# Patient Record
Sex: FEMALE | Race: WHITE | Employment: UNEMPLOYED | URBAN - METROPOLITAN AREA
[De-identification: names, ages, dates, MRNs, and addresses within clinical notes are randomized per-mention and may not be internally consistent; named-entity substitution may affect disease eponyms.]

---

## 2017-11-09 ENCOUNTER — HOSPITAL ENCOUNTER (OUTPATIENT)
Dept: PSYCHIATRY | Age: 37
Discharge: HOME | End: 2017-11-30
Admitting: PSYCHIATRY & NEUROLOGY

## 2017-12-01 ENCOUNTER — HOSPITAL ENCOUNTER (OUTPATIENT)
Dept: OTHER | Age: 37
Discharge: HOME | End: 2017-12-31
Attending: PSYCHIATRY & NEUROLOGY | Admitting: PSYCHIATRY & NEUROLOGY

## 2018-06-05 ENCOUNTER — HOSPITAL ENCOUNTER (OUTPATIENT)
Dept: PHYSICAL THERAPY | Age: 38
Discharge: HOME | End: 2018-06-30
Admitting: PHYSICAL MEDICINE & REHABILITATION

## 2018-06-05 NOTE — PROGRESS NOTES
Patient did not indicate pain in bilateral LE just numbness in right LE. Social History/Environment:    Patient lives with significant other and 6 children in a 1 story house with 4 steps and one rail to enter. Prior Level of Function:     Patient was independent with all functional mobility and did not use any assistive device. Functional Complaints/ Current Level of Function:    Patient unable to use right UE. Patient has weakness in right LE. Patient goal for therapy:  \"increase strength in right LE \"    OBJECTIVE FINDINGS    Cognitive Status  Patient to see Speech therapist    Communication  impaired    Cardiopulmonary Status not formally assessed     Tone slight increase tone right LE. Trunk Control good    Sensation    Upper Extremity: patient to OT   Lower Extremity: decreased sensation right compared to left throughout.      Strength/ROM/Function  Patient to see OT for UE  Movement Left Right Comments Movement Left Right Comments   Shoulder Flexion    Hip Flexion WNL WFL with 4-/5  All left LE 5/5   Shoulder Extension    Hip Extension WNL WFL    Shoulder Abduction    Hip Abduction WNL 4-/5     Shoulder IR    Hip Adduction WNL 2/5     Shoulder ER    Hip Internal Rotation N/T N/T    Elbow Flexion     Hip External Rotation N/T N/T    Elbow Extension    Knee Flexion 137 130 degrees; 4-/5    Supination    Knee Extension 0 0; 3+/5    Pronation    Ankle Dorsiflexion 10 Minus 5 with from neutral with increase ER at hip    Wrist Flexion    Ankle Plantarflexion 45 24 with IR at hip    Wrist Extension    Ankle Inversion 35 25    Hand    Ankle Eversion 10 7    Poor quad control right compared to left    Functional Mobility    Transitional Movement Assistance Level Comments   Rolling to left side Modified Indep    Rolling to right side Modified Indep    Scooting up in bed     Scooting down in bed     Supine to sit Modified Indep    Sit to supine Indep    Sit to stand Indep    Stand to sit Indep Bed to chair transfer     Toilet transfer     Tub transfer     Car transfer     Reflexes: patella 1/3 on right, left 2/3; bilateral achilles 2/3     Balance    Static Sitting: good  Dynamic Sitting: good  Static Stance: good  Dynamic Stance: fair plus   Tinetti Total Score:  24/28    Balance: 14 Gait: 10 Risk of Falls:  low      Gait    Assistance Level: SBA  Device:  None  Orthotics: none   Distance: Through facility  Deviations: weakness noted in right quad. Steps  Step to pattern of gait with ascending and descending stairs. Patient demonstrates noted hyperextension right LE with stairs. Functional Outcome Measure    Measure used:  LEFS  Score: 31/80=39%   % Disability: 61%     ASSESSMENT  Patient demonstrates slight decrease in coordination in right LE. Patient also demonstrates decreased strength in right LE with decreased functional mobility with higher level gait such as stairs. GCode:  /CL    Problems    Decreased ROM      Decreased strength  Decreased joint mobility  Decreased flexibility     Abnormality of gait  Decreased functional status    Decreased balance=- higher level  Poor posture/alignment     Decreased sensation/proprioception    Rehabilitation Potential:  Good for goals listed below. Strengths for achieving goals include:  motivation  Limitations for achieving goals include:  Severity of condition    Prognosis: [x]    Good []    Fair []    Poor    GOALS  GCode: /CI   Short Term Goals (    weeks) Long Term Goals (   weeks)   1). Initiation of HEP 1). Increase right DF to 5 degrees. 2). Fair quad contraction right LE.  2). Increase right LE overall strength to 4/5.   3). Patient able to roll over in bed with minimal to no noted difficulty. 3). Patient able to ascend and descend stairs with reciprocating pattern. 4). 4). LEFS to less than 20%   5). 5). Patient able to amb 200 feet with good heel/toe progression. 6). 6).       PLAN OF CARE    To see patient  2 x/week

## 2018-06-05 NOTE — FLOWSHEET NOTE
Outpatient Physical Therapy     [x] Daily Treatment Note   [] Progress Note   [] Discharge Note    Date:  6/5/2018    Patient Name:  Shruti Mohan        YOB: 1980    Medical Diagnosis:   ICH (Intercranial Hemorrhage) and S/P craniectomy with clot removal; persistent right leslie                                        ICD 10:  I63.9     Treatment Diagnosis:  Right LE weakness and Gait abnormality (PT for advanced gait)     Onset Date:    4/28/18                 Referral Date: 6/1/18      Referring Physician: Amada Neves from [facility] [place]                                                     Visits Allowed/Insurance/Certification Information: [facility] 30 visits     Restrictions/Precautions:  Patient to wear helmet. Patient's mother reports that patient is not to lay flat. Patient also wearing Givmohr sling on right UE for flaccid UE. (which was not donned at beginning of session and mother asked to instruct on donning). Progress Note covers period from (if applicable):    [x]  NA    [] From          To           Next Progress Note due:   6/26/18    Visit# / total visits:  1/12    Plan for Next Session:  Gait training, right LE strengthening with concentration on quad and hip region. Higher level balance activity. Assess single leg balance. Subjective: see evaluation     Pain level: Patient did not indicate pain in bilateral LE just numbness in right. Objective:       Exercises:    Exercises in bold performed in department today. Items not bolded are carried forward from prior visits for continuity of the record. Exercise/Equipment Resistance/Repetitions Other comments   Education: results of evaluation, exercise and plan of care. SAQ and SLR   1x10  Instructed to perform on left LE first then right to aid with muscle education.

## 2018-06-11 ENCOUNTER — ANTI-COAG VISIT (OUTPATIENT)
Dept: PHARMACY | Facility: CLINIC | Age: 38
End: 2018-06-11

## 2018-06-11 LAB — INR BLD: 1.7

## 2018-06-11 RX ORDER — FLUOXETINE HYDROCHLORIDE 20 MG/1
20 CAPSULE ORAL DAILY
COMMUNITY
End: 2020-07-28

## 2018-06-11 RX ORDER — GABAPENTIN 300 MG/1
300 CAPSULE ORAL 3 TIMES DAILY
COMMUNITY

## 2018-06-11 RX ORDER — ATORVASTATIN CALCIUM 80 MG/1
80 TABLET, FILM COATED ORAL DAILY
COMMUNITY

## 2018-06-11 RX ORDER — POLYETHYLENE GLYCOL 3350 17 G/17G
17 POWDER, FOR SOLUTION ORAL DAILY PRN
COMMUNITY

## 2018-06-11 RX ORDER — WARFARIN SODIUM 2.5 MG/1
2.5 TABLET ORAL DAILY
COMMUNITY
End: 2018-07-02 | Stop reason: SDUPTHER

## 2018-06-11 RX ORDER — ATORVASTATIN CALCIUM 40 MG/1
40 TABLET, FILM COATED ORAL DAILY
COMMUNITY
End: 2018-06-11 | Stop reason: CLARIF

## 2018-06-11 RX ORDER — LISINOPRIL 20 MG/1
20 TABLET ORAL DAILY
COMMUNITY

## 2018-06-11 RX ORDER — HYDROCODONE BITARTRATE AND ACETAMINOPHEN 5; 325 MG/1; MG/1
1 TABLET ORAL EVERY 6 HOURS PRN
COMMUNITY
End: 2019-02-08 | Stop reason: ALTCHOICE

## 2018-06-11 RX ORDER — DONEPEZIL HYDROCHLORIDE 5 MG/1
5 TABLET, FILM COATED ORAL NIGHTLY
COMMUNITY

## 2018-06-18 ENCOUNTER — ANTI-COAG VISIT (OUTPATIENT)
Dept: PHARMACY | Facility: CLINIC | Age: 38
End: 2018-06-18

## 2018-06-18 LAB — INR BLD: 4

## 2018-06-25 ENCOUNTER — ANTI-COAG VISIT (OUTPATIENT)
Dept: PHARMACY | Facility: CLINIC | Age: 38
End: 2018-06-25

## 2018-06-25 LAB — INR BLD: 3.8

## 2018-07-01 ENCOUNTER — HOSPITAL ENCOUNTER (OUTPATIENT)
Dept: OTHER | Age: 38
Discharge: HOME | End: 2018-07-01
Attending: PHYSICAL MEDICINE & REHABILITATION | Admitting: PHYSICAL MEDICINE & REHABILITATION

## 2018-07-02 ENCOUNTER — HOSPITAL ENCOUNTER (OUTPATIENT)
Dept: PHYSICAL THERAPY | Age: 38
Discharge: HOME | End: 2018-07-03

## 2018-07-02 ENCOUNTER — ANTI-COAG VISIT (OUTPATIENT)
Dept: PHARMACY | Facility: CLINIC | Age: 38
End: 2018-07-02

## 2018-07-02 LAB — INR BLD: 1.7

## 2018-07-02 RX ORDER — WARFARIN SODIUM 2.5 MG/1
TABLET ORAL
Qty: 30 TABLET | Refills: 3 | Status: SHIPPED | OUTPATIENT
Start: 2018-07-02 | End: 2019-02-04 | Stop reason: ALTCHOICE

## 2018-07-02 NOTE — PROGRESS NOTES
Occupational Therapy Daily Treatment Note/Monthly Progress Note    Date:  2018    Patient Name:  Sri Gandhi    :  1980  Restrictions/Precautions:  Patient to wear helmet. Patient's mother reports that patient is not to lay flat. Patient also wearing Givmohr sling on right UE for flaccid UE. Diagnosis:  I63.9 Cerebral Infarct Unspecified; ICH (Intercranial Hemorrhage) and S/P craniectomy with clot removal; persistent right leslie  Treatment Diagnosis:  Muscle weakness                ICD10:  N35.21  Insurance/Certification information:  [facility], 30 max visits allowed  Referring Physician:  Rica Linares from [address] [place] [address] signed (Y/N):  sent  Visit# / total visits:    Pain level: Mild in R hand. Subjective:  Pt. Accompanied by mother to OT however mother not present during treatment session. Pt. States she has mild pain in R hand. Pt. Presented with helmet on, no givhmor sling. Exercises:  Exercise/Equipment Resistance/Repetitions Other comments   Toileting Supervision and vc to complete managing clothes Not Completed Today   Toilet Transfers Mod I Not Completed Today   LB dressing Mod I; using elastic laces for shoes Not Completed Today   Grooming Mod I standing at sink Not Completed Today   R Resting hand Ortho   Established fit, wear for 15 min w/o s/s of skin breakdown or pain. Instructed pt and patient's mother on don/doffing ortho, PROM prior to application, and positioning. Pt. And mother instructed ortho to be discontinued if red marks do not disappear within 15 minutes and call OTR/L or MD.  Both demonstrated understanding. Not Completed Today   Towel Slides  On Table B UE AROM forward flex/ext; using dowel socrates for B UE integration. 2 minutes each motion Completed Today   Bed Positioning to improve positioning of shoulder joint and UE to prevent pain. This visit:  Dicussed positioning in bed with pt and her mother. Pt.'s mother reported no issues with positioning instructions for last visit. Last visit:  Instructed pt and mother on placement of pillows to support R shoulder and elevated R UE on mat table. Pt. mel understanding and mother verbalized understanding. Not Completed Today   Pre-writing   Instucted pt. On tracing letters k and n with dotted lines and use of pen gripper. Pt. radha'chetan min to moderate variation from dotted lines however presented with improved letter formation with printing her name. Issued practicing sheets with all letters of her name for home use. Last visit:  Pt. Instructed on writing training pattern sheet 2 for pre-writing activity using maker with gripper. PtKit leal improved legibility with printing name. Pt. mel decreased letter formation skills however name was legible. Not Completed Today   Edema management Pt. Presented with slight edema in R hand. Pt./pt mother instructed on PROM and massage to decrease edema in R hand. Educated pt on benefits of R resting orthotic and positioning of R hand elevated in bed to decrease edema. Not Completed Today    Rocking T Knife  Instructed pt on Rocking T knife to cut through theraputty in prep for self-feeding. PtKit leal independence to use rocking t knife. Pt. Given print out and information on where to purchase Rocking T Knife on previous visit. Completed Today   KT Tape Blue KT tape to R shoulder for proprioception and shoulder joint protection. Pt. Presented with good tolerance for tape during tx session. Not Completed Today   Folding clothes and towels and storing items. Mod I to fold towels and place in crate. Pt. And mother Instructed on use of belt to pull laundry basket for transporting clothes. Pt. And mother verbalized understanding. Not Completed Today   Givmorh Sling Doff independently  Min assist to don; Adjusted pt's givmohr sling to reduce wrist extension and provided stockinet for wrist/forearm.   Pt. Satisfied with improvement. Not Completed Today   AAROM Pt. Presented with great improvement with R AROM. 1) Pt. Completed 10 reps A/AAROM R UE  Elbow flex/ext  Pronation Supination  Wrist flex/ext  Fingers/thumb   Provided manual resistance for elbow flex/ext. 2)  Using B UE and dowel socrates with built up  for R hand pt. Completed arm 10-15 arm curls and raising dowel socrates to shoulder level with assist to monitor R UE. Pt. Required extra time and rests. Pt. Instructed to incorporate dowel socrates exercises into HEP routine. Completed Today     PROM R UE  Hand flex/ext (slight movement in first 3 digits) Not Completed Today   Weight Bearing 3 minutes; Pt c/o pain in rest.  Pt. Alleviated after weight removed. Not Completed Today                          Other Therapeutic Activities:       Home Exercise Program:  TBD      OBJECTIVE FINDINGS  Range of Motion  Hand  AROM       RIGHT LEFT-WFL     date 7/2     Shoulder:   flex 75 degrees                         IR WFL                         ER 25 degrees                        ABD 80 degrees     Elbow:      ext/flex WFL/ 125 degrees     Forearm:  sup/pron WFL     Wrist:       ext/flex 10/10                      RD/UD      Index:      MP No ext/ flex almost to palm     Middle, Ring, Small:     No ext/ flex to palm     Thumb:    MP 15                       IP 65 degrees flex; 0 degrees ext                         Comments:  Strength  Muscle  (*denotes pain) Right Left Comments       Elbow Flexion 2/5 5/5     Elbow Extension 2/5 5/5     Pronation 3/5 5/5     Supination 3/5 5/5     Wrist Flexion 2/5 5/5     Wrist Extension 2/5 5/5     Ulnar Deviation NT 5/5     Radial Deviation NT                    5/5           GOALS: G Code:  Handling Objects [id number]   Goals - to be achieved in  2  weeks Goals - to be achieved in  4  weeks   1). Pt./caregiver to demo Mod I for HEP. (Progressing 7/2/2018)  Pt. To demo independence to don/doff givmorh sling. (Progressing SBA-min assist.)   2). Pt. To demo ability to write one sentence legibly. (Progressing 7/2/2018) Pt. To verbalized good tolerance for up to 6 hours of resting hand orthosis wear with 0 s/s of skin breakdown or pain. (Goal Met 7/2/2018)   3). Pt. To demo ability to don/doff resting hand orthotic independently. (Goal Met 7/2/2018) Pt. To demo Mod I to complete folding and putting away clothes. (Goal Met 6/13/2018)   4). New Goal:  Pt. 20 degrees wrist flex/ext. New Goal:  Pt. To demo ability to wash L UE using wash mitt on R hand. 5). New Goal:   Pt. To demo mod I to wash face using wash mitt with R hand.     6).       Assessment:  G Code:  Handling Objects [id number]  RAW 68.18, 100% Disability  Pt. Had participated in 6 OT treatment sessions (1 cancellation) using thera ex, orthosis fit/check, ADL retraining and thera act. Pt. Has met   2:3 short term goals and 2:3 long term goals. Pt.has established R resting hand orthosis wear program during HS at night and demo's good tolerance. Pt.instruction on don/doff hand orthos, skin check, positioning and patient/caregiver ed/instruction. Pt and mother demo'd independence to complete. Pt. presented with great improvement with AROM in R UE. Pt. initially demonstrated flaccid R UE with no trace motion. Pt. Now demonstrates active ROM in shoulder, elbow, forearm, wrist and hand. Please see above for measurements. Pt. demo'd improved independence for self-feeding demonstrating ability to use [id number] using L hand. Pt. Would benefit from OT to continue to improve ROM, strength and coordination to improve functional independence.     Timed Code Treatment Minutes:   63 minutes     Total Treatment Minutes:   63 minutes    Treatment/Activity Tolerance:    [x] Patient tolerated treatment well [] Patient limited by fatigue  [] Patient limited by pain [] Patient limited by other medical complications  [] Other:     Patient Requires Follow-up:  [x] Yes  [] No    Frequency/Duration:  # Days per week: [] 1 day # Weeks: [] 1 week [] 4 weeks      [x] 2 days? [] 2 weeks [] 5 weeks     [] 3 days   [] 3 weeks [] 6 weeks     Rehab Potential: [] Excellent [x] Good [] Fair  [] Poor     Goal Status:  [] Achieved [x] Partially Achieved [] Not Achieved     Patient Status: [] Continue per initial plan of Care     [] Patient now discharged     [x] Additional visits requested, Please re-certify for additional visits:      Requested additional frequency/duration: 2X/week for 6 weeks    Treatment my include ADL retraining, thera ex/act, NMR, Modalities (E-Stim, Ultrasound, heat, cold), and patient/caregiver ed/instruction. Electronically signed by: COLTEN Bautista/STEPHEN  #[id number]    If you have any questions or concerns, please don't hesitate to call.   Thank you for your referral.    Physician Signature:________________________________Date:__________________  By signing above, therapists plan is approved by physician

## 2018-07-02 NOTE — FLOWSHEET NOTE
Feet together, firm surface    \"       Feet half tandem, firm surface    \"       Feet tandem, firm surface     \"       Feet apart, foam surface    \"       Feet together, foam surface     \"       Feet half tandem, foam surface         Feet tandem, foam surface                           Therapeutic Exercise/Home Exercise Program: x 30 minutes. HEP established, See above, pt has handout(s)   Limited time for ex today, pt waiting on PT with another pt, and PT did not want to run over into OT's time. Group Therapy:   N/A    Therapeutic Activity:  NA     Gait: NA  Neuromuscular Re-Education: NA      Manual Therapy: N/A    Modalities:  N/A    Functional Outcome Measure:   Date recorded: 6/5/18  Measure used:  LEFS  Score: 31/80=39%   % Disability: 61%    Assessment/Treatment/Activity Tolerance:    GCode:  /CL  Patients response to treatment:   [x] Patient tolerated treatment well [] Patient limited by fatigue   [] Patient limited by pain [] Patient limited by other medical complications   [] Other:     Goals:   Progress towards goals: NA    GOALS  GCode: /CI   Short Term Goals (    weeks) Long Term Goals (   weeks)   1). Initiation of HEP 1). Increase right DF to 5 degrees. 2). Fair quad contraction right LE.  2). Increase right LE overall strength to 4/5.   3). Patient able to roll over in bed with minimal to no noted difficulty. 3). Patient able to ascend and descend stairs with reciprocating pattern. 4). 4). LEFS to less than 20%   5). 5). Patient able to amb 200 feet with good heel/toe progression. 6). 6).      Prognosis: [x] Good [] Fair  [] Poor    Patient Requires Follow-up:  [x] Yes  [] No    Plan: [x] Continue per plan of care [] Alter current plan (see comments)   [] Plan of care initiated [] Hold pending MD visit [] Discharge    Timed Code Treatment Minutes:  30 minutes    Total Treatment Minutes:  30 minutes + scifit     Medicare Cap total YTD:  N/A    Electronically signed by:  Juliano Tejeda

## 2018-07-16 ENCOUNTER — ANTI-COAG VISIT (OUTPATIENT)
Dept: PHARMACY | Age: 38
End: 2018-07-16
Payer: COMMERCIAL

## 2018-07-16 ENCOUNTER — HOSPITAL ENCOUNTER (OUTPATIENT)
Dept: PHYSICAL THERAPY | Age: 38
Setting detail: THERAPIES SERIES
Discharge: HOME | End: 2018-07-16
Payer: COMMERCIAL

## 2018-07-16 ENCOUNTER — HOSPITAL ENCOUNTER (OUTPATIENT)
Dept: SPEECH THERAPY | Age: 38
Setting detail: THERAPIES SERIES
Discharge: HOME | End: 2018-07-16
Payer: COMMERCIAL

## 2018-07-16 ENCOUNTER — HOSPITAL ENCOUNTER (OUTPATIENT)
Dept: OCCUPATIONAL THERAPY | Age: 38
Setting detail: THERAPIES SERIES
Discharge: HOME | End: 2018-07-16
Payer: COMMERCIAL

## 2018-07-16 LAB — INR BLD: 1.4

## 2018-07-16 PROCEDURE — 92507 TX SP LANG VOICE COMM INDIV: CPT

## 2018-07-16 PROCEDURE — 97112 NEUROMUSCULAR REEDUCATION: CPT

## 2018-07-16 PROCEDURE — 99211 OFF/OP EST MAY X REQ PHY/QHP: CPT | Performed by: PHARMACIST

## 2018-07-16 PROCEDURE — 85610 PROTHROMBIN TIME: CPT | Performed by: PHARMACIST

## 2018-07-16 PROCEDURE — 97032 APPL MODALITY 1+ESTIM EA 15: CPT

## 2018-07-16 PROCEDURE — 97530 THERAPEUTIC ACTIVITIES: CPT

## 2018-07-16 PROCEDURE — 97110 THERAPEUTIC EXERCISES: CPT

## 2018-07-16 PROCEDURE — G0283 ELEC STIM OTHER THAN WOUND: HCPCS

## 2018-07-16 NOTE — PROGRESS NOTES
Occupational Therapy Daily Treatment Note    Date:  2018    Patient Name:  Jonathan Pritchard    :  1980  Restrictions/Precautions:  Patient to wear helmet. Patient's mother reports that patient is not to lay flat. Patient not wearing Givmohr sling on right UE this date. Diagnosis:  I63.9 Cerebral Infarct Unspecified; ICH (Intercranial Hemorrhage) and S/P craniectomy with clot removal; persistent right leslie  Treatment Diagnosis:  Muscle weakness                ICD10:  G54.42  Insurance/Certification information:  Care Source, 30 max visits allowed  Referring Physician:  Princess Dewey from [address] [place] [address] signed (Y/N):  sent  Visit# / total visits:    Pain level: denies    Subjective:  Pt presented with mild edema in right hand  Pt. Presented with helmet on, no givhmor sling. Exercises:  Exercise/Equipment Resistance/Repetitions Other comments   Toileting Supervision and vc to complete managing clothes Not Completed Today   Toilet Transfers Mod I Not Completed Today   LB dressing Mod I; using elastic laces for shoes Demonstrated doffing and donning socks and shoes with elastic laces in shoes with Mod I, reports mother only helping with upper body dressing   Grooming Mod I standing at sink Not Completed Today   R Resting hand Ortho   Established fit, wear for 15 min w/o s/s of skin breakdown or pain. Instructed pt and patient's mother on don/doffing ortho, PROM prior to application, and positioning. Pt. And mother instructed ortho to be discontinued if red marks do not disappear within 15 minutes and call OTR/L or MD.  Both demonstrated understanding. Not Completed Today    Towel Slides  On Table B UE AROM forward flex/ext; using dowel socrates for B UE integration.   2 minutes each motion Completed Today, with  cueing for maintaining even weight distribution bilaterally and minimizing substitutions    Bed Positioning to improve positioning of shoulder joint and UE to prevent pain. This visit:  Dicussed positioning in bed with pt and her mother. Pt.'s mother reported no issues with positioning instructions for last visit. Last visit:  Instructed pt and mother on placement of pillows to support R shoulder and elevated R UE on mat table. PtKit leal understanding and mother verbalized understanding. Not Completed Today   Pre-writing   Instucted pt. On LUE tracing letters k , r, I and s, with use of pen gripper. PtKit leal min to no variation from  lines and improved letter formation with printing her name. Pt encouraged to continue use of practicing sheets with all letters of her name for home use. PtKit leal improved legibility with printing name. Pt. mel decreased letter formation skills however name was legible. Completed Today  Able to write name only (repeatedly), even when trying to write answer to question-appeared frustrated briefly related to aphasia   Edema management Pt. Presented with slight edema in R hand. Pt. encouraged to continue use of PROM and massage to decrease edema in R hand. Educated pt on benefits of R resting orthotic and positioning of R hand elevated in bed to decrease edema. Completed Today-retrograde massage, self-massage    Rocking T Knife  Instructed pt on Rocking T knife to cut through theraputty in prep for self-feeding. PtKit leal independence to use rocking t knife. Pt. Given print out and information on where to purchase Rocking T Knife on previous visit. Not Completed Today   KT Tape Blue KT tape to R shoulder for proprioception and shoulder joint protection. Pt. Presented with good  tolerance for tape during tx session. Not Completed Today   Folding clothes and towels and storing items. Folded washcloths with Mod I using non dominant hand.  Completed Today   Givmorh Sling Doff independently  Min assist to don; Adjusted pt's givmohr sling to reduce wrist extension and provided stockinet for wrist/forearm. Pt. Satisfied with improvement. Not Completed Today-no sling present   AAROM Pt. Presented with  improvement with R AROM. 1) Pt. Completed 10 reps A/AAROM R UE  Elbow flex/ext  Pronation Supination  Wrist flex/ext  Fingers/thumb   Provided manual resistance for elbow flex/ext. 2)  Using B UE and dowel socartes with built up  for R hand and OT assist.  Completed 15 reps of arm curls and raising dowel socrates to shoulder level with assist to monitor R UE. Diagonal patterns 15 reps each direction. 3) Pt completed 15 reps scapular squeezes and shoulder shrugs x 3 sets  Pt. Instructed to incorporate into HEP routine. Completed Today     PROM R UE  Wrist and finger  Flex/ext x 10 Not Completed Today   Weight Bearing 15 minutes, seated. Reaching for items with LUE while weightbearing through R hand and forearm/elbow. B elbow touches on mat table 15 reps x 2 sets, Extended weight bearing through R hand and forearm/elbow 1 min each x 2. Partial sit to stands while pushing through B hands (facilitation support provided for R hand) on knees 15 reps x 3 sets. Pt. Instructed to incorporate into HEP routine. Completed Today                          Other Therapeutic Activities:  E-stim: On-time 14 seconds, Off time 15 seconds; Rate 50 PPS, Waveform: asymmetric; cycling: synchronous; Ramp time 2.0 sec; Pulse width 300; Timer 10 min; Max intensity 100; Min intensity 0; Level 31 10 minutes: Engaged in grasp/release task with each 15 second interval-squeeze ball, squeeze cube and small plastic cup            GOALS: G Code:  Handling Objects [id number]   Goals - to be achieved in  2  weeks Goals - to be achieved in  4  weeks   1). Pt./caregiver to demo Mod I for HEP. (Progressing 7/16/2018)  Pt. To demo independence to don/doff givmorh sling. 2). Pt. To demo ability to write one sentence legibly. (Progressing 1/[id number]) Pt.  To verbalized good tolerance for up to 6 hours of resting hand orthosis wear with 0 s/s

## 2018-07-16 NOTE — PROGRESS NOTES
Outpatient Physical Therapy     [x] Daily Treatment Note   [x] Progress Note   [] Discharge Note    Date:  7/16/2018    Patient Name:  Gurjit Gaitan        YOB: 1980    Medical Diagnosis:   ICH (Intercranial Hemorrhage) and S/P craniectomy with clot removal; persistent right leslie                                        ICD 10:  I63.9     Treatment Diagnosis:  Right LE weakness and Gait abnormality (PT for advanced gait)     Onset Date:    4/28/18                 Referral Date: 6/1/18      Referring Physician: Kirti Motley from [facility]                                                     Visits Allowed/Insurance/Certification Information: [facility] 30 visits     Restrictions/Precautions:  Patient to wear helmet. Patient's mother reports that patient is not to lay flat. Patient also wearing Givmohr sling on right UE for flaccid UE. (which was not donned at beginning of session and mother asked to instruct on donning). Expressive aphasia. Progress Note covers period from (if applicable):    []  NA    [x] From 6/5/18 to 7/9/18  (PN late due to PT overlooked it last visit)      Next Progress Note due:   7/25/18 (last visit prior to surgery)    Visit# / total visits:  8/12          Plan for Next Session:  Progress right LE strengthening with concentration on quad, all hip region and ankle PF. Higher level balance activity. Assess FGI     Subjective:   Mom states surgery date is 7/26/18. Pt reports no new issues since last visit  No more falls     Pain level: \"little bit\" on L side of head       Objective:   Pt to not lie flat without pillows, pt allowed to take helmet off if she is lying down. Exercises:    Exercises in bold performed in department today. Items not bolded are carried forward from prior visits for continuity of the record.   Exercise/Equipment Resistance/Repetitions Other comments   Education: results of evaluation, exercise and minutes    Total Treatment Minutes:  40 + sci-fit     Medicare Cap total YTD:  N/A    Electronically signed by:  Gaby Cordreo, [id number]

## 2018-07-16 NOTE — PROGRESS NOTES
Ms. Prosper Gaffney is here for management of anticoagulation for Stroke. PMH also significant for depression. She presents today w/out complaint. Pt verifies dosing regimen as listed above. Pt denies s/s bleeding/bruising/swelling/SOB. No BRBPR. No melena. Address missed doses  Reviewed pt medication list  No changes in RX/OTCs/Herbal medications. Reviewed dietary concerns  Address EToH and tobacco use. She presents today with her mother. Suffered stroke in April. Limited speaking ability    Surgery scheduled for July 26. We will manage bridging per PCP, plan for 7 days hold pre-op. Will be admitted for a few days post-op, will leave further management to surgery team.  Plan for INR check ~1 week after surgery. INR low today despite dose increase last visit. INR has been very unstable. Will increase dose, though pt will start hold in a couple days for surgery. Plan to restart at 2.5 mg daily post-op. INR 1.4 is below therapeutic range of 2-3   Recommend to take 5 mg today, then increase dose to 2.5 mg daily  Patient has 2.5 mg tablets. Will continue to monitor and check INR in ~3 week. Dosing reminder card given with phone number, appointment date and time.    Return to clinic: 8/6 @ 11:15 am    Eneida Tejeda PharmD 3:36 PM 7/16/18

## 2018-07-16 NOTE — PROGRESS NOTES
Speech Language Pathology  Facility/Department: [facility] SPEECH THERAPY  Daily Treatment Note    Date: 18  NAME: Danielle Agosto  : 1980  MRN: [id number]  PCP: Dr. Kamran Mario  Referring: Dr. Zhao Sarmiento; 30 visits alloted  Medical Dx: Cerebral infarction s/p craniectomy  Treatment Dx: Severe apraxia of speech, severe expressive language deficits, moderate receptive language deficits; cognitive-linguistic skills to be further assessed as clinically indicated. Signed POC: Faxed 18  Visit Rec: 2x/week for 6-8 weeks  Visit #:   31-NYQ: 50  Re-Cert: After 18 visits, or 18 (90 days)  Pain: 0/10    Subjective: The pt was her pleasant, cooperative self. She arrived ~40 minutes late to today's session. Objective:  Goal 1: The pt will follow 1 and 2-step commands with 80% accuracy, min cues. Indirectly targeted during session (1-step commands only)    Goal 2: The pt will answer complex yes/no questions wtih 80% accuracy, min cues. Indirectly targeted during session (basic yes/no)    Goal 3: The pt will produce intelligible phrases that are judged to be 100% intelligible by SLP with use of KATT in 7/10 trials, mod cues. -Opposites:   -Verbal:  (79% average accuracy) trials, min cues (approximation of words were counted as intelligible verbal response). -Names:    -Verbal: 100% average accuracy, min-mod cues; significantly less cueing required this date. Approximation of words were counted as intelligible verbal response). Goal 4: The pt will complete automatic speech tasks using any modality (verbal, gesture, writing, etc.) with 80% accuracy, mod cues. -Orientation questions: Pt able to successfully / intelligibly state that today was \"Monday\". -Names:    -Verbal: 100% average accuracy, min-mod cues; significantly less cueing required this date. Approximation of words were counted as intelligible verbal response).     -ID letters: indirectly targeted (see below, spelling/letters by dictation)  -Counting 1-10 (with visual, SLP encouraged pt to point to each number as she said it): did not directly target this date  -Open-ended questions (ex. \"How are. ..? \"):    -Verbal: 4/5 (80% average accuracy) questions, min-mod cues    Goal 5: The pt will complete graded naming tasks with 90% average accuracy, mod cues  -Opposites:   -Verbal: 11/14 (79% average accuracy) trials, min cues (approximation of words were counted as intelligible verbal response). -Open-ended questions (ex. \"How are. ..? \"):    -Verbal: 4/5 (80% average accuracy) questions, min-mod cues    Goal 6: The pt will complete basic comprehension tasks with 90% accuracy, min cues. -Reading comprehension task (ID sentence that correlates with picture, f2): 100% average accuracy, no cues required    Other Areas Targeted:   -Writing (single words): 4/6 words (67% average accuracy), min cues; limited trials completed  -Spelling (letters to dictation): 2/3 letters (67% average accuracy), min cues; limited trials completed      Assessment:  The pt was pleasant and cooperative throughout session. She was ~40 minutes late to today's session. Limited trials able to be completed of targeted structured tasks 2/2 time constraint. Pt required significantly less cueing to complete verbal expression tasks, however, pt's responses continue to be largely 1-word utterances. Will continue to target goals per POC. Patient/Family/Caregiver Education: SLP provided pt with several verbal expression handouts to complete at home for carryover of information. SLP also encouraged pt to complete automatic speech tasks daily (names, numbers, CHATO, etc.). She demonstrated understanding, but would benefit from ongoing reinforcement. G-Code:  SLP G-Codes  Functional Limitations: Motor speech  Motor Speech Current Status ():  At least 80 percent but less than 100 percent impaired, limited or restricted  Motor Speech

## 2018-07-18 ENCOUNTER — HOSPITAL ENCOUNTER (OUTPATIENT)
Dept: PHYSICAL THERAPY | Age: 38
Setting detail: THERAPIES SERIES
Discharge: HOME | End: 2018-07-18
Payer: COMMERCIAL

## 2018-07-18 ENCOUNTER — APPOINTMENT (OUTPATIENT)
Dept: SPEECH THERAPY | Age: 38
End: 2018-07-18
Payer: COMMERCIAL

## 2018-07-18 ENCOUNTER — APPOINTMENT (OUTPATIENT)
Dept: OCCUPATIONAL THERAPY | Age: 38
End: 2018-07-18
Payer: COMMERCIAL

## 2018-07-18 NOTE — FLOWSHEET NOTE
Physical Therapy  Cancellation/No-show Note  Patient Name:  Sam Arevalo  :  1980   Date:  2018  Cancels to Date: 2  No-shows to Date: 1    For today's appointment patient:  [x]  Cancelled  []  Rescheduled appointment  []  No-show     Reason given by patient:  []  Patient ill  [x]  Conflicting appointment  []  No transportation    []  Conflict with work  []  No reason given  []  Other:     Comments:  Pt presented early this morning (2 hours early) for appointment. Stated she would return. Called back and cancelled appointments as \"something urgent came up\".     Electronically signed by:  Tunde Sorto

## 2018-07-23 ENCOUNTER — HOSPITAL ENCOUNTER (OUTPATIENT)
Dept: SPEECH THERAPY | Age: 38
Setting detail: THERAPIES SERIES
Discharge: HOME | End: 2018-07-23
Payer: COMMERCIAL

## 2018-07-23 ENCOUNTER — HOSPITAL ENCOUNTER (OUTPATIENT)
Dept: OCCUPATIONAL THERAPY | Age: 38
Setting detail: THERAPIES SERIES
Discharge: HOME | End: 2018-07-23
Payer: COMMERCIAL

## 2018-07-23 ENCOUNTER — HOSPITAL ENCOUNTER (OUTPATIENT)
Dept: PHYSICAL THERAPY | Age: 38
Setting detail: THERAPIES SERIES
Discharge: HOME | End: 2018-07-23
Payer: COMMERCIAL

## 2018-07-23 PROCEDURE — 97032 APPL MODALITY 1+ESTIM EA 15: CPT

## 2018-07-23 PROCEDURE — 92507 TX SP LANG VOICE COMM INDIV: CPT

## 2018-07-23 PROCEDURE — 97110 THERAPEUTIC EXERCISES: CPT

## 2018-07-23 NOTE — PROGRESS NOTES
Speech Language Pathology  Facility/Department: [facility] SPEECH THERAPY  Daily Treatment Note    Date: 18  NAME: Naren Schmidt  : 1980  MRN: [id number]  PCP: Dr. Elin Meyers  Referring: Dr. Hyacinth Richmond; 30 visits alloted  Medical Dx: Cerebral infarction s/p craniectomy  Treatment Dx: Severe apraxia of speech, severe expressive language deficits, moderate receptive language deficits; cognitive-linguistic skills to be further assessed as clinically indicated. Signed POC: Faxed 18  Visit Rec: 2x/week for 6-8 weeks  Visit #:   48-GRV: 1/3/29  Re-Cert: After 18 visits, or 18 (90 days)  Pain: 0/10    Subjective: The pt was her pleasant, cooperative self. The pt's mother was present for the initial 10-15 minutes of the session. Pt and pt's mother reported continued difficulty communicating at home 2/2 pt's speech/language deficits. The pt is scheduled for surgery this  and will require a new order from MD prior to initiation of further ST. Pt and pt's mother aware, demonstrated understanding. Objective:  Goal 1: The pt will follow 1 and 2-step commands with 80% accuracy, min cues. Indirectly targeted during session (1-step commands only)    Goal 2: The pt will answer complex yes/no questions wtih 80% accuracy, min cues. Indirectly targeted during session (basic yes/no)    Goal 3: The pt will produce intelligible phrases that are judged to be 100% intelligible by SLP with use of KATT in 7/10 trials, mod cues. -Open-ended sentences / fill-in the blank sentences:    -Verbal: 8/10 (80% average accuracy) questions, mod-max cues; increased cueing required this date for intelliglbe responses. Again, approximation of words were counted as intelligible verbal response.   -Names:    -Verbal: 70% average accuracy, min-mod cues. Approximation of words were counted as intelligible verbal response).   Difficulty discriminating between the pt's sons' names ('Osito' and '[address]'). Pt able to successfully / intelligibly state 'Dedetta Dragon', 'Mom', 'Aaron'. Goal 4: The pt will complete automatic speech tasks using any modality (verbal, gesture, writing, etc.) with 80% accuracy, mod cues. -Orientation questions: Pt able to successfully / intelligibly state that today was \"Monday\". -Names:    -Verbal: 70% average accuracy, min-mod cues. Approximation of words were counted as intelligible verbal response). Difficulty discriminating between the pt's sons' names ('Osito' and '[address]'). Pt able to successfully / intelligibly state 'Amadaa Susana', 'Mom', 'Aaron'. -ID letters (f6): 100% average accuracy, no cues   -SLP also instructed pt to ID the initial letter of targeted words (ex. 'Mom', the pt would ID the letter /m/ or 'swim', the pt would ID the letter /s/). Pt completed this task with 100% average accuracy as well, no cues required.    -Counting 1-10 with visual: ~80% average accuracy, min cues; noted 1-5 is judged to be nearly 100% intelligible by SLP. Goal 5: The pt will complete graded naming tasks with 90% average accuracy, mod cues  -Open-ended sentences / fill-in the blank sentences:    -Verbal: 8/10 (80% average accuracy) questions, mod-max cues; increased cueing required this date for intelliglbe responses. Again, approximation of words were counted as intelligible verbal response. Goal 6: The pt will complete basic comprehension tasks with 90% accuracy, min cues. Did not directly target this date    Other Areas Targeted:   -Writing (single words): 9/11 words (82% average accuracy), min cues  -Targeting bilabial phonemes / CV words (/m/, /b/, /p/, /w/)- pt continues to demonstrate significant improvement transitioning from /m/ to /p/ to /b/ phonemes, etc. when compared to initial therapy sessions. Pt completed with 75% average accuracy, mod cues. Assessment:  The pt was pleasant and cooperative throughout session.   Pt continues to

## 2018-07-23 NOTE — PROGRESS NOTES
Occupational Therapy Daily Treatment Note    Date:  2018    Patient Name:  Starla Briones    :  1980  Restrictions/Precautions:  Patient to wear helmet. Patient's mother reports that patient is not to lay flat. Patient not wearing Givmohr sling on right UE this date. Diagnosis:  I63.9 Cerebral Infarct Unspecified; ICH (Intercranial Hemorrhage) and S/P craniectomy with clot removal; persistent right leslie  Treatment Diagnosis:  Muscle weakness                ICD10:  H05.84  Insurance/Certification information:  [facility], 30 max visits allowed  Referring Physician:  Zo Wheeler from [address] [place] [address] signed (Y/N):  sent  Visit# / total visits:    Pain level: denies    Subjective:  Pt arrived to tx session alone. Mother enter near end of session. Pt. Presented with helmet on, no givhmor sling. When asked about compliance with daily HEP pt gestured sometimes. Exercises:  Exercise/Equipment Resistance/Repetitions Other comments   Toileting Supervision and vc to complete managing clothes Not Completed Today   Toilet Transfers Mod I Not Completed Today   LB dressing Mod I; using elastic laces for shoes Not completed today   Grooming Mod I standing at sink Not Completed Today   R Resting hand Ortho   Established fit, wear for 15 min w/o s/s of skin breakdown or pain. Instructed pt and patient's mother on don/doffing ortho, PROM prior to application, and positioning. Pt. And mother instructed ortho to be discontinued if red marks do not disappear within 15 minutes and call OTR/L or MD.  Both demonstrated understanding. Not Completed Today    Towel Slides  On Table B UE AROM forward flex/ext; using dowel socrates for B UE integration.   2 minutes each motion Not Completed Today, with  cueing for maintaining even weight distribution bilaterally and minimizing substitutions    Bed Positioning to improve positioning of shoulder joint and UE to prevent pain.   This visit:  Dicussed positioning in bed with pt and her mother. Pt.'s mother reported no issues with positioning instructions for last visit. Last visit:  Instructed pt and mother on placement of pillows to support R shoulder and elevated R UE on mat table. PtKit leal understanding and mother verbalized understanding. Not Completed Today   Pre-writing   Instucted pt. On LUE tracing letters k , r, I and s, with use of pen gripper. Pt. mel min to no variation from  lines and improved letter formation with printing her name. Pt encouraged to continue use of practicing sheets with all letters of her name for home use. PtKit leal improved legibility with printing name. Pt. mel decreased letter formation skills however name was legible. Not Completed Today  Able to write name only (repeatedly), even when trying to write answer to question-appeared frustrated briefly related to aphasia   Edema management Pt. Presented with slight edema in R hand. Pt. encouraged to continue use of PROM and massage to decrease edema in R hand. Educated pt on benefits of R resting orthotic and positioning of R hand elevated in bed to decrease edema. Not Completed Today-retrograde massage, self-massage    Rocking T Knife  Instructed pt on Rocking T knife to cut through theraputty in prep for self-feeding. PtKit leal independence to use rocking t knife. Pt. Given print out and information on where to purchase Rocking T Knife on previous visit. Not Completed Today   KT Tape Blue KT tape to R shoulder for proprioception and shoulder joint protection. Pt. Presented with good  tolerance for tape during tx session. Not Completed Today   Folding clothes and towels and storing items. Folded washcloths with Mod I using non dominant hand. Not Completed Today   Givmorh Sling Doff independently  Min assist to don; Adjusted pt's givmohr sling to reduce wrist extension and provided stockinet for wrist/forearm.   Pt. Satisfied with improvement. Not Completed Today-no sling present   AAROM Pt. Presented with  improvement with R AROM. 1) Pt. Completed 10 reps A/AAROM R UE  Elbow flex/ext  Pronation Supination  Wrist flex/ext  Fingers/thumb   Provided manual resistance for elbow flex/ext. Pt. Able to make complete fist with R hand. Completed Today     PROM R UE  Wrist and finger  Flex/ext x 10 Not Completed Today   Weight Bearing 3 minutes, standing with mod assist to keep elbow ext Completed Today   Sustained shoulder flexion Holding up dowel socrates using B UEs. 1st time:  1 min 20 seconds  2nd time:  1 min 30 seconds  3rd time:  1 min 34 seconds  4th time:  48 seconds. Completed Today  Built up handle for R hand to improve hold/grasp. Other Therapeutic Activities:  E-stim: applied to extensor on forearm:  On-time 14 seconds, Off time 15 seconds; Rate 50 PPS, Waveform: asymmetric; cycling: synchronous; Ramp time 2.0 sec; Pulse width 300; Timer 10 min; Max intensity 24; Min intensity 0; 10 minutes: Engaged in grasp/release task with each 15 second interval-squeeze ball then extend finger, thumb, wrist.  Pt. Presented with slight redness at electrode sites. Educated pt that redness should subside however if redness worsens or becomes irritated to notify physician. GOALS: G Code:  Handling Objects [id number]   Goals - to be achieved in  2  weeks Goals - to be achieved in  4  weeks   1). Pt./caregiver to demo Mod I for HEP. (Progressing 7/16/2018)  Pt. To demo independence to don/doff givmorh sling. 2). Pt. To demo ability to write one sentence legibly. (Progressing [id number]) Pt. To verbalized good tolerance for up to 6 hours of resting hand orthosis wear with 0 s/s of skin breakdown or pain. (Goal Met 7/2/2018)   3). Pt. To demo ability to don/doff resting hand orthotic independently. (Goal Met 7/2/2018) Pt. To demo Mod I to complete folding and putting away clothes. (Goal Met 6/13/2018)   4).   Pt. 20

## 2018-07-24 ENCOUNTER — OFFICE VISIT (OUTPATIENT)
Dept: CARDIOLOGY CLINIC | Age: 38
End: 2018-07-24

## 2018-07-24 VITALS
SYSTOLIC BLOOD PRESSURE: 110 MMHG | DIASTOLIC BLOOD PRESSURE: 70 MMHG | HEART RATE: 68 BPM | WEIGHT: 196 LBS | BODY MASS INDEX: 28.94 KG/M2

## 2018-07-24 DIAGNOSIS — I63.311 CEREBROVASCULAR ACCIDENT (CVA) DUE TO THROMBOSIS OF RIGHT MIDDLE CEREBRAL ARTERY (HCC): ICD-10-CM

## 2018-07-24 DIAGNOSIS — Q21.12 PFO (PATENT FORAMEN OVALE): ICD-10-CM

## 2018-07-24 DIAGNOSIS — Z01.818 PRE-OP EVALUATION: Primary | ICD-10-CM

## 2018-07-24 PROCEDURE — 93000 ELECTROCARDIOGRAM COMPLETE: CPT | Performed by: INTERNAL MEDICINE

## 2018-07-24 PROCEDURE — 99204 OFFICE O/P NEW MOD 45 MIN: CPT | Performed by: INTERNAL MEDICINE

## 2018-07-24 PROCEDURE — G8417 CALC BMI ABV UP PARAM F/U: HCPCS | Performed by: INTERNAL MEDICINE

## 2018-07-24 PROCEDURE — 4004F PT TOBACCO SCREEN RCVD TLK: CPT | Performed by: INTERNAL MEDICINE

## 2018-07-24 PROCEDURE — G8427 DOCREV CUR MEDS BY ELIG CLIN: HCPCS | Performed by: INTERNAL MEDICINE

## 2018-07-24 PROCEDURE — G8598 ASA/ANTIPLAT THER USED: HCPCS | Performed by: INTERNAL MEDICINE

## 2018-07-24 ASSESSMENT — ENCOUNTER SYMPTOMS
CHEST TIGHTNESS: 0
COUGH: 0
SHORTNESS OF BREATH: 0
CHOKING: 0

## 2018-07-24 NOTE — PROGRESS NOTES
Subjective:      Patient ID: Jamison Munoz is a 41 y.o. female. HPI  Referred pre op. Had CVA from thrombosis of R MCA. Needs cranioplasty. Slow recovery. Improving. Has been on [facility]. CARMITA at time showed small PFO and minimal R>L shunting only with provocation.  (abd pressure.)  She will be off [facility] for surgery. To be put back on ASAP after surgery. Past Medical History:   Diagnosis Date    Chronic back pain     Depression      Past Surgical History:   Procedure Laterality Date     SECTION      TONSILLECTOMY       Social History     Social History    Marital status: Single     Spouse name: N/A    Number of children: N/A    Years of education: N/A     Occupational History    Not on file. Social History Main Topics    Smoking status: Current Every Day Smoker     Packs/day: 1.00     Types: Cigarettes    Smokeless tobacco: Never Used    Alcohol use No    Drug use: No    Sexual activity: Yes     Partners: Male     Other Topics Concern    Not on file     Social History Narrative    No narrative on file      FH reviewed, non contributory        Review of Systems   Constitutional: Negative for activity change, appetite change and fatigue. Respiratory: Negative for cough, choking, chest tightness and shortness of breath. Cardiovascular: Negative for chest pain, palpitations and leg swelling. Denies PND or orthopnea. No tachycardia or syncope. Neurological: Negative for dizziness, syncope and light-headedness. Psychiatric/Behavioral: Negative for agitation, behavioral problems and confusion. Objective:   Physical Exam   Constitutional: She is oriented to person, place, and time. She appears well-developed and well-nourished. No distress. HENT:   Head: Normocephalic. Eyes: EOM are normal.   Neck: Normal range of motion. No JVD present. Cardiovascular: Normal rate, regular rhythm and normal heart sounds. Exam reveals no gallop.     No murmur heard.  Pulmonary/Chest: Effort normal and breath sounds normal. She has no rales. Abdominal: Soft. Bowel sounds are normal. There is no tenderness. Musculoskeletal: Normal range of motion. She exhibits no edema. Neurological: She is alert and oriented to person, place, and time. No cranial nerve deficit. Right sided weakness. Still aphasic. Skin: Skin is warm and dry. Psychiatric: She has a normal mood and affect. Her behavior is normal. Thought content normal.       Assessment:       Diagnosis Orders   1. Pre-op evaluation  EKG 12 Lead   2. PFO (patent foramen ovale)     3. Cerebrovascular accident (CVA) due to thrombosis of right middle cerebral artery ([address]             Plan:      Some improvement of neuro. Has small PFO by CARMITA with minimal shunting only with provocation. She is in need of cranioplasty. She is on lovenox bridging. AC will be held for surgery. As per Heme AC should be started ASAP after surgery when stable. Would bridge to [facility] until coumadin therapeutic. Reviewed previous testing including CARMITA/LE dopplers/CTA. EKG today normal.  We will have follow up with Dr Jhoana Hyatt.

## 2018-07-25 ENCOUNTER — APPOINTMENT (OUTPATIENT)
Dept: OCCUPATIONAL THERAPY | Age: 38
End: 2018-07-25
Payer: COMMERCIAL

## 2018-07-25 ENCOUNTER — APPOINTMENT (OUTPATIENT)
Dept: PHYSICAL THERAPY | Age: 38
End: 2018-07-25
Payer: COMMERCIAL

## 2018-07-25 ENCOUNTER — APPOINTMENT (OUTPATIENT)
Dept: SPEECH THERAPY | Age: 38
End: 2018-07-25
Payer: COMMERCIAL

## 2018-08-07 ENCOUNTER — TELEPHONE (OUTPATIENT)
Dept: PHARMACY | Age: 38
End: 2018-08-07

## 2018-08-20 ENCOUNTER — ANTI-COAG VISIT (OUTPATIENT)
Dept: PHARMACY | Age: 38
End: 2018-08-20
Payer: COMMERCIAL

## 2018-08-20 LAB — INTERNATIONAL NORMALIZATION RATIO, POC: 3.5

## 2018-08-20 PROCEDURE — 99211 OFF/OP EST MAY X REQ PHY/QHP: CPT | Performed by: PHARMACIST

## 2018-08-20 PROCEDURE — 85610 PROTHROMBIN TIME: CPT | Performed by: PHARMACIST

## 2018-08-20 NOTE — PROGRESS NOTES
Ms. Hina Vega is here for management of anticoagulation for Stroke. PMH also significant for depression. She presents today w/out complaint. Pt verifies dosing regimen as listed above. Pt denies s/s bleeding/bruising/swelling/SOB. No BRBPR. No melena. Address missed doses  Reviewed pt medication list  No changes in RX/OTCs/Herbal medications. Reviewed dietary concerns  Address EToH and tobacco use. She presents today with a . Suffered stroke in April. Limited speaking ability. Per recent notes from [address] [place] [address], plan for at least 6 more months of anticoagulation before further evaluation for continuation (as of 8/2018)    She had been off warfarin for a while due to surgery. Now taking 4 mg daily, per notes from [facility], looks like it has been about a week on this dose. Since somewhat high today after only one week, will make dose decrease today. Her  today did not seem aware of her dosing. Able to determine dose from notes from [address] [place] [address] and asking pt questions she could answer as Y/N.    INR 3.5 is slightly above therapeutic range of 2-3   Recommend to reduce dose to 4 mg daily except 2 mg Mon/Fri  Patient has 2.5 mg tablets. Will continue to monitor and check INR in 1.5 week. Dosing reminder card given with phone number, appointment date and time.    Return to clinic: 8/29 @ 11:15 am    Wayne Meng, PharmD 10:15 AM 8/20/18

## 2018-08-27 ENCOUNTER — ANTI-COAG VISIT (OUTPATIENT)
Dept: PHARMACY | Age: 38
End: 2018-08-27
Payer: COMMERCIAL

## 2018-08-27 DIAGNOSIS — I63.9 CEREBROVASCULAR ACCIDENT (CVA), UNSPECIFIED MECHANISM (HCC): ICD-10-CM

## 2018-08-27 LAB — INTERNATIONAL NORMALIZATION RATIO, POC: 5

## 2018-08-27 PROCEDURE — 99211 OFF/OP EST MAY X REQ PHY/QHP: CPT | Performed by: PHARMACIST

## 2018-08-27 PROCEDURE — 85610 PROTHROMBIN TIME: CPT | Performed by: PHARMACIST

## 2018-08-27 NOTE — PROGRESS NOTES
Ms. Enrico Leija is here for management of anticoagulation for Stroke. PMH also significant for depression. She presents today w/out complaint. Pt verifies dosing regimen as listed above. Pt denies s/s bleeding/bruising/swelling/SOB. No BRBPR. No melena. Address missed doses  Reviewed pt medication list  No changes in RX/OTCs/Herbal medications. Reviewed dietary concerns  Address EToH and tobacco use. She presents today with a . Suffered stroke in April. Limited speaking ability. Per recent notes from [address] [place] [address], plan for at least 6 more months of anticoagulation before further evaluation for continuation (as of 8/2018)    Dose reduced last week from 4 mg daily to 2 mg M/F and 4 mg all other days. INR remains elevated despite dose decrease. Will hold doses and decrease further. INR 5.0 is above therapeutic range of 2-3   Recommend to hold dose tomorrow and Wed, then  reduce dose to 2 mg daily except 4 mg Sun/Tue/Thu  Patient has 2.5 mg tablets. Will continue to monitor and check INR in 1.5 week. Dosing reminder card given with phone number, appointment date and time.    Return to clinic: 9/5 @ 10:15 am    Helen Banks PharmD 3:08 PM 8/27/18

## 2018-09-05 ENCOUNTER — APPOINTMENT (OUTPATIENT)
Dept: SPEECH THERAPY | Age: 38
End: 2018-09-05
Payer: COMMERCIAL

## 2018-09-10 ENCOUNTER — APPOINTMENT (OUTPATIENT)
Dept: OCCUPATIONAL THERAPY | Age: 38
End: 2018-09-10
Payer: COMMERCIAL

## 2018-09-10 ENCOUNTER — ANTI-COAG VISIT (OUTPATIENT)
Dept: PHARMACY | Age: 38
End: 2018-09-10
Payer: COMMERCIAL

## 2018-09-10 ENCOUNTER — APPOINTMENT (OUTPATIENT)
Dept: PHYSICAL THERAPY | Age: 38
End: 2018-09-10
Payer: COMMERCIAL

## 2018-09-10 ENCOUNTER — APPOINTMENT (OUTPATIENT)
Dept: SPEECH THERAPY | Age: 38
End: 2018-09-10
Payer: COMMERCIAL

## 2018-09-10 DIAGNOSIS — I63.9 CEREBROVASCULAR ACCIDENT (CVA), UNSPECIFIED MECHANISM (HCC): ICD-10-CM

## 2018-09-10 LAB — INR BLD: 1.5

## 2018-09-10 PROCEDURE — 99211 OFF/OP EST MAY X REQ PHY/QHP: CPT | Performed by: FAMILY MEDICINE

## 2018-09-10 PROCEDURE — 85610 PROTHROMBIN TIME: CPT | Performed by: FAMILY MEDICINE

## 2018-09-10 NOTE — PROGRESS NOTES
Ms. Chasidy Lacy is here for management of anticoagulation for Stroke. PMH also significant for depression. She presents today w/out complaint. Pt verifies dosing regimen as listed above. Pt denies s/s bleeding/bruising/swelling/SOB. No BRBPR. No melena. Address missed doses  Reviewed pt medication list  No changes in RX/OTCs/Herbal medications. Reviewed dietary concerns  Address EToH and tobacco use. She presents today with a . Suffered stroke in April. Limited speaking ability. Per recent notes from [address] [place] [address], plan for at least 6 more months of anticoagulation before further evaluation for continuation (as of 8/2018)    INR low today, unsure why, could be due to two held doses and a dose decrease. Patient is very unstable. INR 1.5 is below therapeutic range of 2-3   Recommend to increase dose to 2 mg Wed/Fri/Sat, 4 mg all other days. Patient has 2.5 mg tablets. Will continue to monitor and check INR in 2 weeks. Dosing reminder card given with phone number, appointment date and time.    Return to clinic: 9/24 @ 11:15 am    Tammie Garcia, PharmD 9/10/2018 12:42 PM

## 2018-09-12 ENCOUNTER — APPOINTMENT (OUTPATIENT)
Dept: OCCUPATIONAL THERAPY | Age: 38
End: 2018-09-12
Payer: COMMERCIAL

## 2018-09-12 ENCOUNTER — APPOINTMENT (OUTPATIENT)
Dept: SPEECH THERAPY | Age: 38
End: 2018-09-12
Payer: COMMERCIAL

## 2018-09-12 ENCOUNTER — APPOINTMENT (OUTPATIENT)
Dept: PHYSICAL THERAPY | Age: 38
End: 2018-09-12
Payer: COMMERCIAL

## 2018-09-17 ENCOUNTER — APPOINTMENT (OUTPATIENT)
Dept: OCCUPATIONAL THERAPY | Age: 38
End: 2018-09-17
Payer: COMMERCIAL

## 2018-09-17 ENCOUNTER — APPOINTMENT (OUTPATIENT)
Dept: PHYSICAL THERAPY | Age: 38
End: 2018-09-17
Payer: COMMERCIAL

## 2018-09-17 ENCOUNTER — APPOINTMENT (OUTPATIENT)
Dept: SPEECH THERAPY | Age: 38
End: 2018-09-17
Payer: COMMERCIAL

## 2018-09-19 ENCOUNTER — APPOINTMENT (OUTPATIENT)
Dept: PHYSICAL THERAPY | Age: 38
End: 2018-09-19
Payer: COMMERCIAL

## 2018-09-19 ENCOUNTER — APPOINTMENT (OUTPATIENT)
Dept: OCCUPATIONAL THERAPY | Age: 38
End: 2018-09-19
Payer: COMMERCIAL

## 2018-09-19 ENCOUNTER — APPOINTMENT (OUTPATIENT)
Dept: SPEECH THERAPY | Age: 38
End: 2018-09-19
Payer: COMMERCIAL

## 2018-09-24 ENCOUNTER — ANTI-COAG VISIT (OUTPATIENT)
Dept: PHARMACY | Age: 38
End: 2018-09-24
Payer: COMMERCIAL

## 2018-09-24 ENCOUNTER — APPOINTMENT (OUTPATIENT)
Dept: SPEECH THERAPY | Age: 38
End: 2018-09-24
Payer: COMMERCIAL

## 2018-09-24 ENCOUNTER — APPOINTMENT (OUTPATIENT)
Dept: OCCUPATIONAL THERAPY | Age: 38
End: 2018-09-24
Payer: COMMERCIAL

## 2018-09-24 DIAGNOSIS — I63.9 CEREBROVASCULAR ACCIDENT (CVA), UNSPECIFIED MECHANISM (HCC): ICD-10-CM

## 2018-09-24 LAB — INTERNATIONAL NORMALIZATION RATIO, POC: 1.5

## 2018-09-24 PROCEDURE — 99211 OFF/OP EST MAY X REQ PHY/QHP: CPT | Performed by: PHARMACIST

## 2018-09-24 PROCEDURE — 85610 PROTHROMBIN TIME: CPT | Performed by: PHARMACIST

## 2018-10-01 ENCOUNTER — APPOINTMENT (OUTPATIENT)
Dept: PHYSICAL THERAPY | Age: 38
End: 2018-10-01
Payer: COMMERCIAL

## 2018-10-01 ENCOUNTER — APPOINTMENT (OUTPATIENT)
Dept: SPEECH THERAPY | Age: 38
End: 2018-10-01
Payer: COMMERCIAL

## 2018-10-01 ENCOUNTER — APPOINTMENT (OUTPATIENT)
Dept: OCCUPATIONAL THERAPY | Age: 38
End: 2018-10-01
Payer: COMMERCIAL

## 2018-10-03 ENCOUNTER — APPOINTMENT (OUTPATIENT)
Dept: OCCUPATIONAL THERAPY | Age: 38
End: 2018-10-03
Payer: COMMERCIAL

## 2018-10-03 ENCOUNTER — APPOINTMENT (OUTPATIENT)
Dept: PHYSICAL THERAPY | Age: 38
End: 2018-10-03
Payer: COMMERCIAL

## 2018-10-03 ENCOUNTER — APPOINTMENT (OUTPATIENT)
Dept: SPEECH THERAPY | Age: 38
End: 2018-10-03
Payer: COMMERCIAL

## 2018-10-08 ENCOUNTER — ANTI-COAG VISIT (OUTPATIENT)
Dept: PHARMACY | Age: 38
End: 2018-10-08
Payer: COMMERCIAL

## 2018-10-08 ENCOUNTER — APPOINTMENT (OUTPATIENT)
Dept: PHYSICAL THERAPY | Age: 38
End: 2018-10-08
Payer: COMMERCIAL

## 2018-10-08 ENCOUNTER — APPOINTMENT (OUTPATIENT)
Dept: SPEECH THERAPY | Age: 38
End: 2018-10-08
Payer: COMMERCIAL

## 2018-10-08 ENCOUNTER — APPOINTMENT (OUTPATIENT)
Dept: OCCUPATIONAL THERAPY | Age: 38
End: 2018-10-08
Payer: COMMERCIAL

## 2018-10-08 DIAGNOSIS — I63.9 CEREBROVASCULAR ACCIDENT (CVA), UNSPECIFIED MECHANISM (HCC): ICD-10-CM

## 2018-10-08 LAB — INTERNATIONAL NORMALIZATION RATIO, POC: 1.6

## 2018-10-08 PROCEDURE — 99211 OFF/OP EST MAY X REQ PHY/QHP: CPT | Performed by: PHARMACIST

## 2018-10-08 PROCEDURE — 85610 PROTHROMBIN TIME: CPT | Performed by: PHARMACIST

## 2018-10-10 ENCOUNTER — APPOINTMENT (OUTPATIENT)
Dept: OCCUPATIONAL THERAPY | Age: 38
End: 2018-10-10
Payer: COMMERCIAL

## 2018-10-10 ENCOUNTER — APPOINTMENT (OUTPATIENT)
Dept: PHYSICAL THERAPY | Age: 38
End: 2018-10-10
Payer: COMMERCIAL

## 2018-10-10 ENCOUNTER — HOSPITAL ENCOUNTER (OUTPATIENT)
Dept: OCCUPATIONAL THERAPY | Age: 38
Setting detail: THERAPIES SERIES
Discharge: HOME | End: 2018-10-10
Payer: COMMERCIAL

## 2018-10-10 ENCOUNTER — HOSPITAL ENCOUNTER (OUTPATIENT)
Dept: PHYSICAL THERAPY | Age: 38
Setting detail: THERAPIES SERIES
Discharge: HOME | End: 2018-10-10
Payer: COMMERCIAL

## 2018-10-10 ENCOUNTER — HOSPITAL ENCOUNTER (OUTPATIENT)
Dept: SPEECH THERAPY | Age: 38
Setting detail: THERAPIES SERIES
Discharge: HOME | End: 2018-10-10
Payer: COMMERCIAL

## 2018-10-10 ENCOUNTER — APPOINTMENT (OUTPATIENT)
Dept: SPEECH THERAPY | Age: 38
End: 2018-10-10
Payer: COMMERCIAL

## 2018-10-10 PROCEDURE — 97161 PT EVAL LOW COMPLEX 20 MIN: CPT

## 2018-10-10 PROCEDURE — G8978 MOBILITY CURRENT STATUS: HCPCS

## 2018-10-10 PROCEDURE — 97535 SELF CARE MNGMENT TRAINING: CPT

## 2018-10-10 PROCEDURE — G8979 MOBILITY GOAL STATUS: HCPCS

## 2018-10-10 PROCEDURE — G8985 CARRY GOAL STATUS: HCPCS

## 2018-10-10 PROCEDURE — 97166 OT EVAL MOD COMPLEX 45 MIN: CPT

## 2018-10-10 PROCEDURE — G9162 LANG EXPRESS CURRENT STATUS: HCPCS

## 2018-10-10 PROCEDURE — 92523 SPEECH SOUND LANG COMPREHEN: CPT

## 2018-10-10 PROCEDURE — 97110 THERAPEUTIC EXERCISES: CPT

## 2018-10-10 PROCEDURE — G8984 CARRY CURRENT STATUS: HCPCS

## 2018-10-10 PROCEDURE — G9163 LANG EXPRESS GOAL STATUS: HCPCS

## 2018-10-10 NOTE — PLAN OF CARE
[address] [address] [address] Phone: [phone] Hospital Fax: [phone]    To: Dr. 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 Clyde Stout,  The following patient has been evaluated for speech therapy services and for therapy to continue, we require monthly physician review of the treatment plan. Please review the attached evaluation and/or summary of the patient's plan of care, and verify that you agree therapy should continue by signing the attached document and sending it back to our office. Plan of Care/Treatment to date:  [x] Speech-Language Evaluation/Treatment   [x] Cognitive-Linguistic Skills Development       Frequency/Duration:  # Days per week: [] 1 day # Weeks: [] 1 week [] 5 weeks      [x] 2 days? [] 2 weeks [x] 6 weeks     [] 3 days   [] 3 weeks [] 7 weeks     [] 4 days   [] 4 weeks [x] 8 weeks    Rehab Potential: [] Excellent [x] Good [] Fair  [] Poor       Electronically signed by:    Bc Dewitt M.S. [id number] [place] [address]  Speech-language pathologist  [id number]      If you have any questions or concerns, please don't hesitate to call.   Thank you for your referral.      Physician Signature:________________________________Date:__________________  By signing above, therapists plan is approved by physician

## 2018-10-10 NOTE — FLOWSHEET NOTE
by 1 muscle grade in weak areas   3). 3). Improve DGI score to 22/24 or better   4).  4).   5). 5).   6). 6).        Prognosis: [x] Good [] Fair  [] Poor    Patient Requires Follow-up:  [x] Yes  [] No    Plan: [] Continue per plan of care [] Alter current plan (see comments)   [x] Plan of care initiated [] Hold pending MD visit [] Discharge    Timed Code Treatment Minutes:  30    Total Treatment Minutes:   60      Electronically signed by:  Pavithra Forde, [id number]

## 2018-10-10 NOTE — PROGRESS NOTES
NEUROLOGICAL / GENERAL MOBILITY PHYSICAL THERAPY EVALUATION        Evaluation Date: 9/5/2018                                                Patient Name:  Jonathan Guillory                              YOB: 1980                                 Medical Diagnosis:            CVA due to embolism of L MCA                                     ICD 10:  I63.412     Treatment Diagnosis:  RLE weakness, abnormal gait pattern, mild standing dynamic balance deficits, hypertonia of R side     Onset Date:  Surgery date 7/26/2018           Referral Date:   8/24/2018     Referring Physician:  Dr. 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       SUBJECTIVE FINDINGS     History of Present Illness:  Per surgeon (Dr. Minerva Conti MD 8/8/2018 f/u appointment): Pt suffered \"large left MCA stroke on 4/30/18, which necessitated an emergent life saving leslie-craniectomy. She has had an uncomplicated recovery since then and is presently ambulatory. She remains expressively aphasic, but is able to communicate effectively.  She has a successful cranioplasty on 7/26/18 and presents today for staple removal\"      Pain       No complaints of pain     Current Functional Limitations:   [x]Yes   []No   Functional Complaints:      RUE use for daily tasks (friend reports patient with significant disuse of R arm) denies dressing

## 2018-10-10 NOTE — PROGRESS NOTES
[facility]  Total # of Visits Approved: 30  Subjective  Subjective: Pt pleasant and cooperative throughout evaluation. Apraxia and severe expressive language deficits persist.   Pain Assessment  Patient Currently in Pain: No  Vision  Vision: Within Functional Limits  Hearing  Hearing: Within functional limits     Objective:  Oral/Motor  Oral Motor: Within functional limits  Auditory Comprehension  Comprehension: Exceptions  Complex Questions: Mild  Two Step Basic Commands: Mild  Multistep Basic Commands: Mild-Moderate  Complex/Abstract Commands: Mild-Moderate  L/R Discrimination: To be assessed in therapy  Conversation: To be assessed in therapy (Needs further assessment)  Interfering Components: Motor planning;Processing speed; Working memory; Attention - sustained  Effective Techniques: Extra processing time; Increased volume;Repetition;Stressing words;Visual/Gestural cues  Reading Comprehension  Reading Status: Unable to assess (Did not formally assess this date); pt reports not reading at home 2/2 difficulty  Expression  Primary Mode of Expression: Verbal (However, apraxia persists; pt utilizes gestures, writing, head nod for yes/no as well)  Verbal Expression  Verbal Expression: Exceptions to functional limits  Initiation: Mod  Repetition: Mod-Severe; particularly with utterances > 2 words. Automatic Speech: Moderate  Confrontation: Mild-moderate; during today's evaluation, approximations of words were counted as correct, intelligible responses by SLP  Convergent: To be assessed in therapy  Divergent: To be assessed in therapy  Responsive: To be assessed in therapy  Conversation: Severe  Interfering Components:  (Apraxia)  Effective Techniques: Communication board;Provide extra time; Word retrived strategies;Oral motor techniques  Written Expression  Dominant Hand: Left (R-sided hemiplegia 2/2 CVA)  Written Expression: [facility]  Legibility Impairment Severity: Moderate  Dictation Impairment

## 2018-10-15 ENCOUNTER — APPOINTMENT (OUTPATIENT)
Dept: SPEECH THERAPY | Age: 38
End: 2018-10-15
Payer: COMMERCIAL

## 2018-10-15 ENCOUNTER — APPOINTMENT (OUTPATIENT)
Dept: OCCUPATIONAL THERAPY | Age: 38
End: 2018-10-15
Payer: COMMERCIAL

## 2018-10-15 ENCOUNTER — APPOINTMENT (OUTPATIENT)
Dept: PHYSICAL THERAPY | Age: 38
End: 2018-10-15
Payer: COMMERCIAL

## 2018-10-22 ENCOUNTER — HOSPITAL ENCOUNTER (OUTPATIENT)
Dept: PHYSICAL THERAPY | Age: 38
Setting detail: THERAPIES SERIES
Discharge: HOME | End: 2018-10-22
Payer: COMMERCIAL

## 2018-10-22 ENCOUNTER — HOSPITAL ENCOUNTER (OUTPATIENT)
Dept: OCCUPATIONAL THERAPY | Age: 38
Setting detail: THERAPIES SERIES
Discharge: HOME | End: 2018-10-22
Payer: COMMERCIAL

## 2018-10-22 ENCOUNTER — HOSPITAL ENCOUNTER (OUTPATIENT)
Dept: SPEECH THERAPY | Age: 38
Setting detail: THERAPIES SERIES
Discharge: HOME | End: 2018-10-22
Payer: COMMERCIAL

## 2018-10-22 PROCEDURE — 97112 NEUROMUSCULAR REEDUCATION: CPT

## 2018-10-22 PROCEDURE — 92507 TX SP LANG VOICE COMM INDIV: CPT

## 2018-10-22 PROCEDURE — 97110 THERAPEUTIC EXERCISES: CPT

## 2018-10-22 PROCEDURE — 97535 SELF CARE MNGMENT TRAINING: CPT

## 2018-10-22 PROCEDURE — 97530 THERAPEUTIC ACTIVITIES: CPT

## 2018-10-22 NOTE — PROGRESS NOTES
with 90% accuracy, min cues. - SLP instructed pt to verbalize and write the majority of target responses. Verbal:  -First name: independent  -Last name: mod-max cues; not intelligible  -Sons' names: mod cues; difficult to differentiate between her sons' names ('Osito' and 'Trentin')  -Friend's name: mod-max cues; not intelligible  -Town of residence: mod-max cues; not intelligible  -Age: mod cues  -Age of children: independent  -Count 1-10: min cues  Written:  -First name: independent  -Last name: independent  -Sons' names: independent  -Friend's name: independent  -[address] [facility] of residence: independent  -Age:mod cues  -Age of children: independent     Goal 5: The pt will write single sentences with 70% accuracy, mod cues. - indirectly targeted during session (see automatics above); largely at single-word level only      Other Areas Targeted:   -Reading comprehension (sentence-level)   -f2 sentences: 100% accuracy, no cues   -f3 sentences: 90% accuracy, min cues      Assessment:  The pt was pleasant and cooperative throughout session. Pt continues to demonstrate severe apraxia errors that negatively impact her overall speech intelligibility / communication. Again noted significant improvement when targeting bilabial phonemes / CV words, particularly when transitioning from /m/ to /p/ to /b/ phonemes, etc.  Will continue to target graded naming and verbal expression tasks during future therapy sessions. Patient/Family/Caregiver Education: SLP provided pt with several verbal expression handouts to complete at home for carryover of information. SLP again emphasized completion of automatic speech tasks daily (names, numbers, CHATO, etc.) via both written and verbal responses. She verbalized understanding, but would benefit from ongoing reinforcement.       G-Code:  Functional Limitations: Spoken Language Expression  Spoken Language Expression Current Status (): CL- At least 60 percent but less than 80 percent

## 2018-10-24 ENCOUNTER — ANTI-COAG VISIT (OUTPATIENT)
Dept: PHARMACY | Age: 38
End: 2018-10-24
Payer: COMMERCIAL

## 2018-10-24 DIAGNOSIS — I63.9 CEREBROVASCULAR ACCIDENT (CVA), UNSPECIFIED MECHANISM (HCC): ICD-10-CM

## 2018-10-24 LAB — INTERNATIONAL NORMALIZATION RATIO, POC: 1.8

## 2018-10-24 PROCEDURE — 85610 PROTHROMBIN TIME: CPT | Performed by: PHARMACIST

## 2018-10-24 PROCEDURE — 99211 OFF/OP EST MAY X REQ PHY/QHP: CPT | Performed by: PHARMACIST

## 2018-10-29 ENCOUNTER — HOSPITAL ENCOUNTER (OUTPATIENT)
Dept: PHYSICAL THERAPY | Age: 38
Setting detail: THERAPIES SERIES
Discharge: HOME | End: 2018-10-29
Payer: COMMERCIAL

## 2018-10-29 ENCOUNTER — HOSPITAL ENCOUNTER (OUTPATIENT)
Dept: SPEECH THERAPY | Age: 38
Setting detail: THERAPIES SERIES
Discharge: HOME | End: 2018-10-29
Payer: COMMERCIAL

## 2018-10-29 ENCOUNTER — HOSPITAL ENCOUNTER (OUTPATIENT)
Dept: OCCUPATIONAL THERAPY | Age: 38
Setting detail: THERAPIES SERIES
Discharge: HOME | End: 2018-10-29
Payer: COMMERCIAL

## 2018-10-29 PROCEDURE — 97110 THERAPEUTIC EXERCISES: CPT

## 2018-10-29 PROCEDURE — 92507 TX SP LANG VOICE COMM INDIV: CPT

## 2018-10-29 PROCEDURE — 97112 NEUROMUSCULAR REEDUCATION: CPT

## 2018-10-29 NOTE — FLOWSHEET NOTE
Exercises in bold performed in department today. Items not bolded are carried forward from prior visits for continuity of the record. Exercise/Equipment Resistance/Repetitions Other comments   ellliptical X 3 minutes level 1 LEs only for 2nd half of activity; attempted with R hand placement stationary with cueing for , unable to maintain R      sidelying hip abduction Reviewed instructed to progress to 3x 10 HO given, increase to 3x10 as tolerated, 1-2x/day     sidelying hip adduction Reviewed instructed to progress to  3x10  \"   Bridges   Reviewed instructed to progress to  3x10  \"       Step ups to 6 inch step backward  Step up laterally 6 inch step on R 2X 10 reps on R  2x10 on R single UE support;  In clinic only                     Sidestepping blue t-band X 4 reps x 10 ft on R No UE support, in clinic only     Seated ankle DF + eversion blue t-band 3x10 Cueing needed for technique; HO given,Cues for ankle range only; HO given     Mini-squats/wall slides 3x10 HO given   Leg press 1x10 plate 4  [id number] plate 5      Heel raises/toe raises 3x12 Progressed to heel raises as well; cueing needed for full range      Standing knee flexion 3# 3x10 on R HO given with theraband alt (seated)       gastroc stretch RLE NV       Prone hip extension for HEP NV                    Neuromuscular re-ed     stabilization on RLE on firm surface reaching outside of BEAR for cones to floor X 5 cones BLE support  X 5 cones toe touch LLE  x5 cones toe touch and extended      SLS on foam on R Eyes open x 30 minute CGA  Eyes closed 30 sec mod A No UE support; cues for  No support     Lunges to BOSU on R 3x12 reps No UE support     Balance on upside down BOSU X 30 sec no UE support Assist with assuming position     Heel and toe walking  X 10 ft x 2 sets No UE support   LE PNF D1 and D2 2x10 all directions Single UE support   quadraped with BUE support (assisting RUE stability) + alt BLE extension 2x10  RUE support and assist especially through R hand wrist and hand extension     Therapeutic Exercise/Home Exercise Program:  X25 minutes. HEP issued, reviewed and revised as needed; see above    Noted R gastroc tightness with assessment today; needs stretching next visit  Group Therapy:    0 minutes    Therapeutic Activity:  0 minutes     Gait: 0 minutes    Neuromuscular Re-Education: 25 minutes    See above  DGI activities; NV in hallway    Canalith Repositioning Procedure:  0 minutes     Manual Therapy:  0 minutes    Modalities: 0 minutes      GCODEs and Functional Outcome Measure:            Assessment/Treatment/Activity Tolerance:    Patients response to treatment:   [x] Patient tolerated treatment well [] Patient limited by fatigue   [] Patient limited by pain [] Patient limited by other medical complications   [] Other:     Goals:   Progress towards goals:  Established 10/10/2018  Short Term Goals: 2    weeks Long Term Goals:   3  weeks   1). Establish HEP 1). Pt independent with HEP   2). Improve RLE strength by 1/3 muscle grade in weak areas 2). Improve RLE strength by 1 muscle grade in weak areas   3). 3). Improve DGI score to 22/24 or better   4).  4).   5). 5).   6). 6).        Prognosis: [x] Good [] Fair  [] Poor    Patient Requires Follow-up:  [x] Yes  [] No    Plan: [x] Continue per plan of care [] Alter current plan (see comments)   [] Plan of care initiated [] Hold pending MD visit [] Discharge    Timed Code Treatment Minutes:  50    Total Treatment Minutes:  50      Electronically signed by:  Christine Easton, [id number]

## 2018-10-31 ENCOUNTER — APPOINTMENT (OUTPATIENT)
Dept: OCCUPATIONAL THERAPY | Age: 38
End: 2018-10-31
Payer: COMMERCIAL

## 2018-10-31 ENCOUNTER — APPOINTMENT (OUTPATIENT)
Dept: SPEECH THERAPY | Age: 38
End: 2018-10-31
Payer: COMMERCIAL

## 2018-10-31 ENCOUNTER — APPOINTMENT (OUTPATIENT)
Dept: PHYSICAL THERAPY | Age: 38
End: 2018-10-31
Payer: COMMERCIAL

## 2018-11-05 ENCOUNTER — HOSPITAL ENCOUNTER (OUTPATIENT)
Dept: PHYSICAL THERAPY | Age: 38
Setting detail: THERAPIES SERIES
Discharge: HOME | End: 2018-11-05
Payer: COMMERCIAL

## 2018-11-05 ENCOUNTER — HOSPITAL ENCOUNTER (OUTPATIENT)
Dept: OCCUPATIONAL THERAPY | Age: 38
Setting detail: THERAPIES SERIES
Discharge: HOME | End: 2018-11-05
Payer: COMMERCIAL

## 2018-11-05 ENCOUNTER — HOSPITAL ENCOUNTER (OUTPATIENT)
Dept: SPEECH THERAPY | Age: 38
Setting detail: THERAPIES SERIES
Discharge: HOME | End: 2018-11-05
Payer: COMMERCIAL

## 2018-11-05 PROCEDURE — 97110 THERAPEUTIC EXERCISES: CPT

## 2018-11-05 PROCEDURE — 92507 TX SP LANG VOICE COMM INDIV: CPT

## 2018-11-05 PROCEDURE — 97112 NEUROMUSCULAR REEDUCATION: CPT

## 2018-11-05 PROCEDURE — 97530 THERAPEUTIC ACTIVITIES: CPT

## 2018-11-05 NOTE — FLOWSHEET NOTE
Outpatient Physical Therapy     [x] Daily Treatment Note   [] Progress Note   [] Discharge Note    Date:  11/5/2018    Patient Name:  Tan Kim         YOB: 1980    Medical Diagnosis:            CVA due to embolism of L MCA     ICD 10:  I63. 412     Treatment Diagnosis:  RLE weakness, abnormal gait pattern, mild standing dynamic balance deficits, hypertonia of R side     Onset Date:  Surgery date 7/26/2018           Referral Date:   8/24/2018     Referring Physician:  Dr. Karey Vega MD; Shelton Weston NP     Visits Allowed/Insurance/Certification Information:  caresource 30 visits allowed (had previous 8 visits of PT prior to this episode)     Restrictions/Precautions: expressive aphasia, pt unable to verbalize any further precautions from MD however pointing to L side of neck; denies HOB restrictions or need for helmet anymore; friend unable to clarify any current restrictions    Plan of care sent to provider:  [x]  NA   [] Faxed   []  Co-signature       Plan of care signed:   [x]  NA   []   Yes   []   No      Progress Note covers period from (if applicable):    [x]  NA    [] From          To           Next Progress Note due: 11/7/2018      Visit# / total visits:  4/6    Plan for Next Session:  Progressive closed chain strengthening of BLE, dynamic static standing progression with head turns, RLE extension focus of strengthening; gastroc stretch RLE    History of Present Illness:  Per surgeon (Dr. Lance Junior MD 8/8/2018 f/u appointment): Pt suffered \"large left MCA stroke on 4/30/18, which necessitated an emergent life saving leslie-craniectomy. She has had an uncomplicated recovery since then and is presently ambulatory. She remains expressively aphasic, but is able to communicate effectively.  She has a successful cranioplasty on 7/26/18 and presents today for staple removal\"     Subjective:   Pt reports some increased pain in RLE in knee and foot  Increased coldness and numbness in R foot only     Pain level:   AT EVAL: no complaints of pain      Objective:       Exercises:    Exercises in bold performed in department today. Items not bolded are carried forward from prior visits for continuity of the record. Exercise/Equipment Resistance/Repetitions Other comments   ellliptical X 2.5 minutes level 1 Reports increased pain with limited ability to continue past 2.5 minutes today     sidelying hip abduction Reviewed instructed to progress to 3x 10 HO given, increase to 3x10 as tolerated, 1-2x/day     sidelying hip adduction Reviewed instructed to progress to  3x10  \"   Bridges   3x15  \"       Step ups to 6 inch step backward  Step up laterally 6 inch step on R 2X 10 reps on R  2x10 on R single UE support; In clinic only                     Sidestepping blue t-band X 4 reps x 10 ft on R No UE support, in clinic only     Seated ankle DF + eversion blue t-band 3x12 Cueing needed for technique; HO given,Cues for ankle range only; HO given     Mini-squats/wall slides 3x10 HO given   Leg press 3x10 plate 5      Heel raises/toe raises 3x12 Progressed to heel raises as well; cueing needed for full range      Standing knee flexion 3# 3x12 on R HO given with theraband alt (seated)       gastroc stretch RLE 30 x 3 Needs HO at NV      Prone hip extension for HEP 2x10 HO given     BAPS board level 3 standing DF/PF/EVR/INR on R 2x10     LAQ on R 3x12 3#  HO given     Neuromuscular re-ed     stabilization on RLE on firm surface reaching outside of BEAR for cones to floor X 5 cones BLE support  X 5 cones toe touch LLE  x5 cones toe touch and extended      SLS on foam on R Eyes open x 30 minute CGA x 2   No UE support; cues for  No support   SLS on foam on R; toe taps on L forward.  Middle and back  SLS on foam on R: toe taps on L laterally side to side with cross over 2x10    2x10 Single UE support; cues for slight flexion on R     Lunges to BOSU on R 3x12 reps No UE support     Balance on upside down BOSU X 30 sec no [id number]

## 2018-11-05 NOTE — PROGRESS NOTES
comments)   [] Plan of care initiated [] Hold pending MD visit [] Discharge    Plan for Next Session:      See Weekly Progress Note: [] Yes  [] No Next due:        Electronically signed by:   Emil Mortensen OTR/STEPHEN #[id number]

## 2018-11-07 ENCOUNTER — HOSPITAL ENCOUNTER (OUTPATIENT)
Dept: OCCUPATIONAL THERAPY | Age: 38
Setting detail: THERAPIES SERIES
Discharge: HOME | End: 2018-11-07
Payer: COMMERCIAL

## 2018-11-07 ENCOUNTER — HOSPITAL ENCOUNTER (OUTPATIENT)
Dept: SPEECH THERAPY | Age: 38
Setting detail: THERAPIES SERIES
Discharge: HOME | End: 2018-11-07
Payer: COMMERCIAL

## 2018-11-07 ENCOUNTER — ANTI-COAG VISIT (OUTPATIENT)
Dept: PHARMACY | Age: 38
End: 2018-11-07
Payer: COMMERCIAL

## 2018-11-07 ENCOUNTER — HOSPITAL ENCOUNTER (OUTPATIENT)
Dept: PHYSICAL THERAPY | Age: 38
Setting detail: THERAPIES SERIES
Discharge: HOME | End: 2018-11-07
Payer: COMMERCIAL

## 2018-11-07 DIAGNOSIS — I63.9 CEREBROVASCULAR ACCIDENT (CVA), UNSPECIFIED MECHANISM (HCC): ICD-10-CM

## 2018-11-07 LAB — INTERNATIONAL NORMALIZATION RATIO, POC: 2.4

## 2018-11-07 PROCEDURE — 99211 OFF/OP EST MAY X REQ PHY/QHP: CPT | Performed by: PHARMACIST

## 2018-11-07 PROCEDURE — 97110 THERAPEUTIC EXERCISES: CPT

## 2018-11-07 PROCEDURE — 92507 TX SP LANG VOICE COMM INDIV: CPT

## 2018-11-07 PROCEDURE — 85610 PROTHROMBIN TIME: CPT | Performed by: PHARMACIST

## 2018-11-07 PROCEDURE — G8978 MOBILITY CURRENT STATUS: HCPCS

## 2018-11-07 PROCEDURE — 97112 NEUROMUSCULAR REEDUCATION: CPT

## 2018-11-07 NOTE — PROGRESS NOTES
Occupational Therapy Daily Treatment Note    Date:  2018    Patient Name:  Tien Sanders    :  1980  Restrictions/Precautions:    Diagnosis:  CVA  Treatment Diagnosis:  Generalized Muscle Weakness  Insurance/Certification information:  [employer]  Referring Physician:  2018   Plan of care signed (Y/N):  sent  Visit# / total visits:  5/  Pain level:    Patient did not report pain.     Subjective:  Pt. Cooperative and pleasant. Pt. Reports participating in recommended exercises/activities at home. Exercises/Activities/ADLs:  Exercise/Equipment Resistance/Repetitions Other comments   Squeezing Foam Ball with R hand 10 x squeeze and extend fingers Not Completed Today   Fasteners Zipper, caroline, buttons mild difficulty  Tying shoe strings with extreme difficulty. Pt. Instructed on adaptive procedure to tie with L hand. PtKit Barrett's ability to tie one set of laces after instruction. Not Completed Today  Issued pt curly laces for tennis shoes. Yellow Foam Sponge  10x squeeze and release Not Completed Today     Thera Ex AROM RUE  10 reps Elbow flex/ext with manual resistence  Fisting/releasing using squeezable ball 15 reps x2;  10 reps Wrist flex/ext, hand flex/ext with red theraputty  10 reps pronation/supination of R forearm with assist from L hand  AAROM for shoulder flex/ext, with manual resistance 10 reps   Completed Today   Weight Bearing through R UE 5 minutes Completed Today   Towel Slides Hoizontal Ab/adduction 5 minutes   Not Completed Today   Standing at floor mirror [place] IRA assist to wipe down mirror using soft clothe using R UE flex/ex, horizontal ab/adduction Not Completed Today     Wash Mitt Pt. Issued wash mitt. PtKit barrett'd ability to use mitt to wash L side of body with min assist to complete during dry run. Pt. Encouraged to use R UE for functional tasks as much as possible at home. Pt. Verbalized understanding.  Not Completed Today     Lfiting sponge ball into container

## 2018-11-12 ENCOUNTER — APPOINTMENT (OUTPATIENT)
Dept: PHYSICAL THERAPY | Age: 38
End: 2018-11-12
Payer: COMMERCIAL

## 2018-11-12 ENCOUNTER — APPOINTMENT (OUTPATIENT)
Dept: SPEECH THERAPY | Age: 38
End: 2018-11-12
Payer: COMMERCIAL

## 2018-11-12 ENCOUNTER — APPOINTMENT (OUTPATIENT)
Dept: OCCUPATIONAL THERAPY | Age: 38
End: 2018-11-12
Payer: COMMERCIAL

## 2018-11-14 ENCOUNTER — APPOINTMENT (OUTPATIENT)
Dept: PHYSICAL THERAPY | Age: 38
End: 2018-11-14
Payer: COMMERCIAL

## 2018-11-14 ENCOUNTER — HOSPITAL ENCOUNTER (OUTPATIENT)
Dept: OCCUPATIONAL THERAPY | Age: 38
Setting detail: THERAPIES SERIES
Discharge: HOME | End: 2018-11-14
Payer: COMMERCIAL

## 2018-11-14 ENCOUNTER — HOSPITAL ENCOUNTER (OUTPATIENT)
Dept: PHYSICAL THERAPY | Age: 38
Setting detail: THERAPIES SERIES
Discharge: HOME | End: 2018-11-14
Payer: COMMERCIAL

## 2018-11-14 ENCOUNTER — HOSPITAL ENCOUNTER (OUTPATIENT)
Dept: SPEECH THERAPY | Age: 38
Setting detail: THERAPIES SERIES
Discharge: HOME | End: 2018-11-14
Payer: COMMERCIAL

## 2018-11-14 ENCOUNTER — APPOINTMENT (OUTPATIENT)
Dept: SPEECH THERAPY | Age: 38
End: 2018-11-14
Payer: COMMERCIAL

## 2018-11-19 ENCOUNTER — HOSPITAL ENCOUNTER (OUTPATIENT)
Dept: OCCUPATIONAL THERAPY | Age: 38
Setting detail: THERAPIES SERIES
Discharge: HOME | End: 2018-11-19
Payer: COMMERCIAL

## 2018-11-19 ENCOUNTER — HOSPITAL ENCOUNTER (OUTPATIENT)
Dept: SPEECH THERAPY | Age: 38
Setting detail: THERAPIES SERIES
Discharge: HOME | End: 2018-11-19
Payer: COMMERCIAL

## 2018-11-19 PROCEDURE — 92507 TX SP LANG VOICE COMM INDIV: CPT

## 2018-11-19 PROCEDURE — 97110 THERAPEUTIC EXERCISES: CPT

## 2018-11-19 PROCEDURE — G9163 LANG EXPRESS GOAL STATUS: HCPCS

## 2018-11-19 PROCEDURE — G9162 LANG EXPRESS CURRENT STATUS: HCPCS

## 2018-11-19 NOTE — PROGRESS NOTES
naming (approximations counted as correct responses): 11/15 (71% average accuracy), min cues    Goal 4: The pt will verbalize automatic speech tasks with 90% accuracy, min cues. - See goal 2    Goal 5: The pt will write single sentences with 70% accuracy, mod cues. -One-word written response to picture description task (see below): 5/7 (71% accuracy) questions, mod cues    Other Areas Targeted:   -Short verbal responses to picture description task: 6/7 (86% accuracy) questions, min-no cues; approximation counted as intelligible response. Assessment:  Majority of today's session focused on targeting of individual phonemes via L[employer]olly Doodle page, as well as, reviewing speech sound articulation placement chart (see above). Pt benefited from mod-max cues throughout 2/2 continued severity of apraxic errors. SLP again reiterated importance of repetition / drill of targets. SLP also encouraged x3 successful, intelligible, and consecutive responses for each target. Pt continues to be able to answer basic conversational questions during session (i.e. 'How was your weekend?'). Patient/Family/Caregiver Education: SLP re: rationale for today's structured tasks with handouts provided regarding targeted speech sound articulation placements (bilabials, alveolars, and velars). SLP encouraged tp to complete several times daily with at least 3 successful, intelligible, and consecutive responses before proceeding to the next target. She verbalized understanding. The pt's next scheduled therapy session is not scheduled until 11/28/18.     G-Code:  Functional Limitations: Spoken Language Expression  Spoken Language Expression Current Status (): CL- At least 60 percent but less than 80 percent impaired, limited, or restricted  Spoken Language Expression Goal Status (): CJ- At least 20 percent but less than 40 percent impaired, limited, or restricted    Plan:  Continued daily Speech/Language treatment with goals per POC.       Treatment time:  0940-1889 (47 minutes); speech tx    Jose Stein M.S. [address] [facility], [place], [facility]  Speech-language pathologist  [phone]

## 2018-11-19 NOTE — PROGRESS NOTES
regarding targeted speech sound articulation placements (bilabials, alveolars, and velars). SLP encouraged tp to complete reviewed exercises several times daily with at least 3 successful, intelligible, and consecutive responses before proceeding to the next target. She verbalized understanding. The pt's next scheduled therapy session is not scheduled until 11/28/18. G-Code:  Functional Limitations: Spoken Language Expression  Spoken Language Expression Current Status (): CL- At least 60 percent but less than 80 percent impaired, limited, or restricted  Spoken Language Expression Goal Status (): CJ- At least 20 percent but less than 40 percent impaired, limited, or restricted    Plan:  Continued daily Speech/Language treatment with goals per POC.       Ofelia Jose M.S. [facility]  Speech-language pathologist  [phone]

## 2018-11-21 ENCOUNTER — APPOINTMENT (OUTPATIENT)
Dept: OCCUPATIONAL THERAPY | Age: 38
End: 2018-11-21
Payer: COMMERCIAL

## 2018-11-21 ENCOUNTER — APPOINTMENT (OUTPATIENT)
Dept: SPEECH THERAPY | Age: 38
End: 2018-11-21
Payer: COMMERCIAL

## 2018-11-26 ENCOUNTER — HOSPITAL ENCOUNTER (OUTPATIENT)
Dept: OCCUPATIONAL THERAPY | Age: 38
Setting detail: THERAPIES SERIES
Discharge: HOME | End: 2018-11-26
Payer: COMMERCIAL

## 2018-11-26 ENCOUNTER — HOSPITAL ENCOUNTER (OUTPATIENT)
Dept: PHYSICAL THERAPY | Age: 38
Setting detail: THERAPIES SERIES
Discharge: HOME | End: 2018-11-26
Payer: COMMERCIAL

## 2018-11-26 NOTE — PROGRESS NOTES
Occupational Therapy  Cancellation/No-show Note  Patient Name:  Tien Sanders  :  1980   Date:  2018  Cancels to Date: 2  No-shows to Date: 0    For today's appointment patient:  [x] Cancelled  [] Rescheduled appointment  [] No-show     Reason given by patient:  [] Patient ill  [] Conflicting appointment  [] No transportation    [] Conflict with work  [] No reason given  [x] Other:     Comments:  Pt cancelled today due to children home from school and no childcare available  Electronically signed by:  Collette Candelario OTR/STEPHEN

## 2018-12-03 ENCOUNTER — APPOINTMENT (OUTPATIENT)
Dept: OCCUPATIONAL THERAPY | Age: 38
End: 2018-12-03
Payer: COMMERCIAL

## 2018-12-03 ENCOUNTER — APPOINTMENT (OUTPATIENT)
Dept: SPEECH THERAPY | Age: 38
End: 2018-12-03
Payer: COMMERCIAL

## 2018-12-12 ENCOUNTER — HOSPITAL ENCOUNTER (OUTPATIENT)
Dept: OCCUPATIONAL THERAPY | Age: 38
Setting detail: THERAPIES SERIES
Discharge: HOME | End: 2018-12-12
Payer: COMMERCIAL

## 2018-12-12 ENCOUNTER — HOSPITAL ENCOUNTER (OUTPATIENT)
Dept: SPEECH THERAPY | Age: 38
Setting detail: THERAPIES SERIES
Discharge: HOME | End: 2018-12-12
Payer: COMMERCIAL

## 2018-12-12 PROCEDURE — 97535 SELF CARE MNGMENT TRAINING: CPT

## 2018-12-12 PROCEDURE — 97110 THERAPEUTIC EXERCISES: CPT

## 2018-12-12 PROCEDURE — 97112 NEUROMUSCULAR REEDUCATION: CPT

## 2018-12-12 PROCEDURE — 92507 TX SP LANG VOICE COMM INDIV: CPT

## 2018-12-12 NOTE — PROGRESS NOTES
and /sh/ only): 75% average accuracy, mod-max cues. Goal 3: The pt will complete basic graded naming tasks with 80% accuracy, min cues. Did not directly target this date    Goal 4: The pt will verbalize automatic speech tasks with 90% accuracy, min cues. - indirectly targeted during goal 2 (see above)  - SLP also had pt provide both verbal and written responses for several automatics (pt's first and last name, her kids' names, her boyfriend's name, the year, month, day of week, town of residence). -The pt was able to independently and correctly write / spell her response for all trials listed above, except month and day of week (she required mod-max cues to write these responses correctly); 75% accuracy, no cues. -She was able to provide an approximation for the majority of responses (approximation judged as intelligible response -- 75% average accuracy, min cues). *It continues to be difficult to differentiate between her sons' names ('Osito' and 'Trentin' 2/2 her apraxia) and benefits from mod-max cues. Goal 5: The pt will write single sentences with 70% accuracy, mod cues. - indirectly targeted during automatic speech tasks (see above); however, pt writing largely one-word responses only (vs. Sentences)    Other Areas Targeted:   -Reading comprehension task (answering question regarding a TV listing): 7/7 (100% average accuracy), min cues   -ID numbers / times (*indirectly targeted during reading comprehension task above): 70% average accuracy, min-mod cues; SLP continues to encourage pt to state individual numbers separately with numbers >1-digit (i.e. 48). Assessment:  Again, the majority of today's session focused on targeting of individual phonemes via [employer] page, as well as, speech sound articulation placement chart (see above). Significantly less cueing required this date for improved accuracy across targeted phonemes.   SLP again reiterated importance of repetition / drill

## 2018-12-12 NOTE — FLOWSHEET NOTE
Occupational Therapy Daily Treatment Note/Re-certification    Date:  2018    Patient Name:  Janee Patel    :  1980  Restrictions/Precautions:    Diagnosis:  CVA  Treatment Diagnosis:  Generalized muscle weakness  Insurance/Certification information:  [facility]  Referring Physician:  Ginna Manning NP  Plan of care signed (Y/N):  sent  Visit# / total visits:6 /    Pain level: 0/10     Subjective:    Pt returns to therapy after absence due to children's illness. Pt reports compliance with home exercises and is pleasant and cooperative. Exercises/Activities/ADLs:  Exercise/Equipment Resistance/Repetitions Other comments        Fasteners Zipper, caroline, buttons mild difficulty  Tying shoe strings with extreme difficulty. Pt. Instructed on adaptive procedure to tie with L hand. PtKit Barrett's ability to tie one set of laces after instruction. Not Completed Today  (Issued pt curly laces for tennis shoes). Pink Foam Sponge  10x squeeze and release  Completed Today  ssued pink sponge to squeeze/release at home. Cont  HEP   Thera Ex AROM RUE  Fisting/releasing pom poms into container; 3x 7 reps  10 reps pronation/supination of R forearm with assist from L hand  AROM shoulder PNF pattern: stacking cones 2x 6 reps, with assist to grasp cones and release    Completed Today   Weight Bearing through R UE 5 minutes; standing with wt shift forward/back and side to side Completed Today  Cont HEP   Tabletop Slides: pushing cones Hoizontal Ab/adduction; shld Int/ext rot, wrist flex/ext; x 15 reps each   Completed Today  Cont HEP   Standing at floor mirror [place] assist to wipe down mirror using soft clothe using R UE flex/ex, horizontal ab/adduction Not Completed Today     Wash Mitt Pt. Issued wash mitt. Pt. radha'd ability to use mitt to wash L side of body with min assist to complete during dry run. Pt. Encouraged to use R UE for functional tasks as much as possible at home.   Pt. Verbalized understanding. Not Completed Today     ADLs Instructed pt in use of AE for cooking, stirring, cutting, and adaptive technique for donning/doffing bra including fasteners     Pt demo'd cutting with rocker knife, issued catalog for pt to order adaptive cutting board, knife, can opener and pot stabilizer as neded   Picking up small blocks with R hand  Squeezing, pronation, supination, hand/wrist ext 28x Not Completed Today   Large  Velcro Roll Wrist Flex/ext, finger flex/ext with min-mod assist 5 reps forward and back. Not Completed Today    Isometric Exercise  Pronation 10 reps Not Completed Today     Thumb Exerciser 10 reps; min assist to hold thumb eserciser Not Completed Today   Sustained shoulder flex   3 min using L UE to assist in sustaining R shoulder flex, elbow ext, wrist/hand ext up against wall; best time 3 minutes Not Completed Today     Horizontal AB/Addcution  Using 3# arm weight on R UE for 5 minutes Not Completed Today     Arm Bike 1 minute using B UEs with min assist for R UE;  Pt. Unable to maintain  on arm bike with R hand. Not Completed Today   Ergonomic Hand Gripper  10# resistance (1 red rubber band) 15x with min assist to stabilize gripper. Not Completed Today     Stretches Pt. Instructed on AAROM/Stretches;   Wrist and finger flex/ext  Pronation/Supination  Holding each for 20 seconds. Pt. Required verbal cues to complete.  Completed Today  Cont HEP   Blue Pilates Ball   R UE  Rolling ball forward and back Not Completed Today          Other Therapeutic Activities:          Home Exercise Program:  Revised as above    Hand  AROM           RIGHT       date 10/10/2018 12/12/2018     Shoulder:   Flex/ex 95/30  140/45                         IR WFL  WFL                         ER    15                        ABD    120     Elbow:      ext/flex WFL  WFL     Forearm:  sup/pron 70/WFL 80/WFL     Wrist:       ext/flex 25/25  30/40                      RD/UD         Fingers: (FLEX)  MP approx 90  approx 90                      PIPs 90  approx 90                                 DIP                          Incomplete Extension of fingers.                   incomplete extension   incomplete extension                                Thumb:    MP 0  0                       IP 90          90              radial abd         opposition         tip to [facility]            Comments:Noted improvement in shoulder ROM     Strength  Muscle  (*denotes pain) Right 10/10/2018 right  12/12/2018 Comments       Shoulder Flex 2/5 3+/5     Shoulder Abduction 2/5 3+/5     Elbow Flexion /5 3+/5     Elbow Extension /5 3+/5     Pronation 3/5 3+/5     Supination 2/5 2/5     Wrist Flexion 2/5 3/5     Wrist Extension 2/5 2/5                     Comments: Noted improvement in strength    GOALS: G Code:  Handling Objects [id number]   Goals - to be achieved in  2  weeks Goals - to be achieved in  8  weeks   1).  Pt. To demo return independence to complete HEP.  (Continue goal 11/5/2018) 1). Pt. To demo ability to hold a pot with R UE and stir with L UE.  (Continue goal 12/12/2018)   2).  Pt. To demo #5  in R hand. (Continue goal 12/12/2018)  2). Pt to demo functional use of RUE as  stabilization for light meal prep and cooking   3).  Pt. To demo 110 degrees R shoulder flex. (Goal Met 11/5/2018)  3)pt to demo increased shoulder strength by .5 muscle grade   4).  Pt. To demo 50 degrees R wrist flex/ext. (Continue goal 12/12/2018)  4). Pt to demo INFD with HEP   5).     6).         Assessment:Pt has been seen for 6 total visits for LUE ROM and strengthening, NM re-education activities, establish HEP, and ADL retraining. Pt has tolerated sessions well, and would benefit from continued therapy to address the above iisted goals.   Faxed message 11/19 for Genna Feng CNP to verify there are no contraindications for E-Stim to R UE,  [facility] verify no contraindications for use of E-stim to LUE        PLAN OF CARE  To see patient for  2  x/week for  8

## 2018-12-19 ENCOUNTER — HOSPITAL ENCOUNTER (OUTPATIENT)
Dept: SPEECH THERAPY | Age: 38
Setting detail: THERAPIES SERIES
Discharge: HOME | End: 2018-12-19
Payer: COMMERCIAL

## 2018-12-19 ENCOUNTER — HOSPITAL ENCOUNTER (OUTPATIENT)
Dept: OCCUPATIONAL THERAPY | Age: 38
Setting detail: THERAPIES SERIES
End: 2018-12-19
Payer: COMMERCIAL

## 2018-12-19 PROCEDURE — 92507 TX SP LANG VOICE COMM INDIV: CPT

## 2018-12-19 PROCEDURE — G9162 LANG EXPRESS CURRENT STATUS: HCPCS

## 2018-12-19 PROCEDURE — G9163 LANG EXPRESS GOAL STATUS: HCPCS

## 2018-12-22 NOTE — PROGRESS NOTES
Illness:  Per surgeon (Dr. Namita Colbert MD 8/8/2018 f/u appointment): Pt suffered \"large left MCA stroke on 4/30/18, which necessitated an emergent life saving leslie-craniectomy. She has had an uncomplicated recovery since then and is presently ambulatory. She remains expressively aphasic, but is able to communicate effectively. She has a successful cranioplasty on 7/26/18 and presents today for staple removal\"     Subjective:   Reports R knee pain better  Continues with coldness and numbness in R foot only      Pain level:   AT EVAL: no complaints of pain      Objective:       Exercises:    Exercises in bold performed in department today. Items not bolded are carried forward from prior visits for continuity of the record. Exercise/Equipment Resistance/Repetitions Other comments   ellliptical X  3 minutes level 1 Reports just fatigue today requesting different machine   bike x2 minutes level 3 No pain in R knee     sidelying hip abduction Reviewed instructed to progress to 3x 10 HO given, increase to 3x10 as tolerated, 1-2x/day     sidelying hip adduction Reviewed instructed to progress to  3x10  \"   Bridges   3x10 5# \"       Step ups to 6 inch step backward  Step up laterally 6 inch step on R 2X 10 reps on R  2x10 on R single UE support;  In clinic only                     Sidestepping blue t-band X 4 reps x 10 ft on R No UE support, in clinic only     Seated ankle DF + eversion blue t-band 3x15 (with knee extended today anti-gravity to facilitate larger AROM) Cueing needed for technique; HO given,Cues for ankle range only; HO given     Mini-squats/wall slides 3x10 HO given   Leg press 3x12 plate 5      Heel raises/toe raises 3x12 Progressed to heel raises as well; cueing needed for full range      Standing knee flexion 3# 3x12 on R HO given with theraband alt (seated)       gastroc stretch RLE 30 x 3       Prone hip extension for HEP 2x10      BAPS board level 3 standing DF/PF/EVR/INR on R 2x10     LAQ on R 3x15 3#
Grossly Intact/Right LE/Head/Neck/Right UE

## 2019-01-07 ENCOUNTER — HOSPITAL ENCOUNTER (OUTPATIENT)
Dept: SPEECH THERAPY | Age: 39
Setting detail: THERAPIES SERIES
Discharge: HOME | End: 2019-01-07
Payer: COMMERCIAL

## 2019-01-07 ENCOUNTER — HOSPITAL ENCOUNTER (OUTPATIENT)
Dept: OCCUPATIONAL THERAPY | Age: 39
Setting detail: THERAPIES SERIES
Discharge: HOME | End: 2019-01-07
Payer: COMMERCIAL

## 2019-01-07 PROCEDURE — 97110 THERAPEUTIC EXERCISES: CPT

## 2019-01-07 PROCEDURE — 92507 TX SP LANG VOICE COMM INDIV: CPT

## 2019-01-07 PROCEDURE — 97112 NEUROMUSCULAR REEDUCATION: CPT

## 2019-01-14 ENCOUNTER — HOSPITAL ENCOUNTER (OUTPATIENT)
Dept: SPEECH THERAPY | Age: 39
Setting detail: THERAPIES SERIES
Discharge: HOME | End: 2019-01-14
Payer: COMMERCIAL

## 2019-01-14 ENCOUNTER — HOSPITAL ENCOUNTER (OUTPATIENT)
Dept: OCCUPATIONAL THERAPY | Age: 39
Setting detail: THERAPIES SERIES
Discharge: HOME | End: 2019-01-14
Payer: COMMERCIAL

## 2019-01-14 PROCEDURE — 97110 THERAPEUTIC EXERCISES: CPT

## 2019-01-14 PROCEDURE — 92507 TX SP LANG VOICE COMM INDIV: CPT

## 2019-01-14 PROCEDURE — 97032 APPL MODALITY 1+ESTIM EA 15: CPT

## 2019-01-14 PROCEDURE — 97112 NEUROMUSCULAR REEDUCATION: CPT

## 2019-01-16 ENCOUNTER — HOSPITAL ENCOUNTER (OUTPATIENT)
Dept: OCCUPATIONAL THERAPY | Age: 39
Setting detail: THERAPIES SERIES
Discharge: HOME | End: 2019-01-16
Payer: COMMERCIAL

## 2019-01-16 ENCOUNTER — HOSPITAL ENCOUNTER (OUTPATIENT)
Dept: SPEECH THERAPY | Age: 39
Setting detail: THERAPIES SERIES
Discharge: HOME | End: 2019-01-16
Payer: COMMERCIAL

## 2019-01-16 PROCEDURE — 97530 THERAPEUTIC ACTIVITIES: CPT

## 2019-01-16 PROCEDURE — 97112 NEUROMUSCULAR REEDUCATION: CPT

## 2019-01-16 PROCEDURE — 97110 THERAPEUTIC EXERCISES: CPT

## 2019-01-16 PROCEDURE — 92507 TX SP LANG VOICE COMM INDIV: CPT

## 2019-01-16 PROCEDURE — 97032 APPL MODALITY 1+ESTIM EA 15: CPT

## 2019-01-21 ENCOUNTER — HOSPITAL ENCOUNTER (OUTPATIENT)
Dept: SPEECH THERAPY | Age: 39
Setting detail: THERAPIES SERIES
Discharge: HOME | End: 2019-01-21
Payer: COMMERCIAL

## 2019-01-21 ENCOUNTER — HOSPITAL ENCOUNTER (OUTPATIENT)
Dept: OCCUPATIONAL THERAPY | Age: 39
Setting detail: THERAPIES SERIES
Discharge: HOME | End: 2019-01-21
Payer: COMMERCIAL

## 2019-01-23 ENCOUNTER — HOSPITAL ENCOUNTER (OUTPATIENT)
Dept: OCCUPATIONAL THERAPY | Age: 39
Setting detail: THERAPIES SERIES
Discharge: HOME | End: 2019-01-23
Payer: COMMERCIAL

## 2019-01-23 ENCOUNTER — HOSPITAL ENCOUNTER (OUTPATIENT)
Dept: SPEECH THERAPY | Age: 39
Setting detail: THERAPIES SERIES
Discharge: HOME | End: 2019-01-23
Payer: COMMERCIAL

## 2019-01-23 PROCEDURE — 97032 APPL MODALITY 1+ESTIM EA 15: CPT

## 2019-01-23 PROCEDURE — 97112 NEUROMUSCULAR REEDUCATION: CPT

## 2019-01-23 PROCEDURE — 97110 THERAPEUTIC EXERCISES: CPT

## 2019-01-23 PROCEDURE — 92507 TX SP LANG VOICE COMM INDIV: CPT

## 2019-01-23 PROCEDURE — 97530 THERAPEUTIC ACTIVITIES: CPT

## 2019-01-28 ENCOUNTER — HOSPITAL ENCOUNTER (OUTPATIENT)
Dept: OCCUPATIONAL THERAPY | Age: 39
Setting detail: THERAPIES SERIES
Discharge: HOME | End: 2019-01-28
Payer: COMMERCIAL

## 2019-01-28 ENCOUNTER — HOSPITAL ENCOUNTER (OUTPATIENT)
Dept: SPEECH THERAPY | Age: 39
Setting detail: THERAPIES SERIES
Discharge: HOME | End: 2019-01-28
Payer: COMMERCIAL

## 2019-01-28 PROCEDURE — 92507 TX SP LANG VOICE COMM INDIV: CPT

## 2019-01-28 PROCEDURE — 97110 THERAPEUTIC EXERCISES: CPT

## 2019-01-28 PROCEDURE — 97140 MANUAL THERAPY 1/> REGIONS: CPT

## 2019-01-28 PROCEDURE — 97530 THERAPEUTIC ACTIVITIES: CPT

## 2019-01-30 ENCOUNTER — APPOINTMENT (OUTPATIENT)
Dept: SPEECH THERAPY | Age: 39
End: 2019-01-30
Payer: COMMERCIAL

## 2019-01-30 ENCOUNTER — APPOINTMENT (OUTPATIENT)
Dept: OCCUPATIONAL THERAPY | Age: 39
End: 2019-01-30
Payer: COMMERCIAL

## 2019-02-04 ENCOUNTER — OFFICE VISIT (OUTPATIENT)
Dept: CARDIOLOGY CLINIC | Age: 39
End: 2019-02-04
Payer: COMMERCIAL

## 2019-02-04 VITALS
OXYGEN SATURATION: 98 % | HEART RATE: 63 BPM | SYSTOLIC BLOOD PRESSURE: 116 MMHG | HEIGHT: 69 IN | BODY MASS INDEX: 30.36 KG/M2 | DIASTOLIC BLOOD PRESSURE: 64 MMHG | WEIGHT: 205 LBS

## 2019-02-04 DIAGNOSIS — F17.200 SMOKING ADDICTION: ICD-10-CM

## 2019-02-04 DIAGNOSIS — I63.9 CEREBROVASCULAR ACCIDENT (CVA), UNSPECIFIED MECHANISM (HCC): Primary | ICD-10-CM

## 2019-02-04 DIAGNOSIS — I10 ESSENTIAL HYPERTENSION: ICD-10-CM

## 2019-02-04 DIAGNOSIS — Q21.12 PFO (PATENT FORAMEN OVALE): ICD-10-CM

## 2019-02-04 PROCEDURE — G8417 CALC BMI ABV UP PARAM F/U: HCPCS | Performed by: INTERNAL MEDICINE

## 2019-02-04 PROCEDURE — 4004F PT TOBACCO SCREEN RCVD TLK: CPT | Performed by: INTERNAL MEDICINE

## 2019-02-04 PROCEDURE — G8598 ASA/ANTIPLAT THER USED: HCPCS | Performed by: INTERNAL MEDICINE

## 2019-02-04 PROCEDURE — 93000 ELECTROCARDIOGRAM COMPLETE: CPT | Performed by: INTERNAL MEDICINE

## 2019-02-04 PROCEDURE — 99215 OFFICE O/P EST HI 40 MIN: CPT | Performed by: INTERNAL MEDICINE

## 2019-02-04 PROCEDURE — G8484 FLU IMMUNIZE NO ADMIN: HCPCS | Performed by: INTERNAL MEDICINE

## 2019-02-04 PROCEDURE — G8427 DOCREV CUR MEDS BY ELIG CLIN: HCPCS | Performed by: INTERNAL MEDICINE

## 2019-02-04 RX ORDER — ASPIRIN 325 MG
325 TABLET, DELAYED RELEASE (ENTERIC COATED) ORAL DAILY
Qty: 30 TABLET | Refills: 3
Start: 2019-02-04

## 2019-02-05 ENCOUNTER — TELEPHONE (OUTPATIENT)
Dept: CARDIOLOGY CLINIC | Age: 39
End: 2019-02-05

## 2019-02-08 ENCOUNTER — HOSPITAL ENCOUNTER (OUTPATIENT)
Dept: CARDIAC CATH/INVASIVE PROCEDURES | Age: 39
Discharge: HOME | End: 2019-02-08
Payer: COMMERCIAL

## 2019-02-08 ENCOUNTER — HOSPITAL ENCOUNTER (OUTPATIENT)
Dept: VASCULAR LAB | Age: 39
Discharge: HOME | End: 2019-02-08
Payer: COMMERCIAL

## 2019-02-08 DIAGNOSIS — Q21.12 PFO (PATENT FORAMEN OVALE): ICD-10-CM

## 2019-02-08 DIAGNOSIS — I63.9 CEREBROVASCULAR ACCIDENT (CVA), UNSPECIFIED MECHANISM (HCC): ICD-10-CM

## 2019-02-08 LAB — PREGNANCY, URINE: NEGATIVE

## 2019-02-08 PROCEDURE — 2580000003 HC RX 258

## 2019-02-08 PROCEDURE — 93325 DOPPLER ECHO COLOR FLOW MAPG: CPT

## 2019-02-08 PROCEDURE — 84703 CHORIONIC GONADOTROPIN ASSAY: CPT

## 2019-02-08 PROCEDURE — 6360000002 HC RX W HCPCS

## 2019-02-08 PROCEDURE — 99152 MOD SED SAME PHYS/QHP 5/>YRS: CPT

## 2019-02-08 PROCEDURE — 93320 DOPPLER ECHO COMPLETE: CPT

## 2019-02-08 PROCEDURE — 93312 ECHO TRANSESOPHAGEAL: CPT

## 2019-02-08 PROCEDURE — 93880 EXTRACRANIAL BILAT STUDY: CPT

## 2019-02-08 RX ORDER — OXYCODONE AND ACETAMINOPHEN 10; 325 MG/1; MG/1
1 TABLET ORAL EVERY 6 HOURS PRN
COMMUNITY
Start: 2019-01-11

## 2019-02-08 RX ORDER — SODIUM CHLORIDE 0.9 % (FLUSH) 0.9 %
10 SYRINGE (ML) INJECTION PRN
Status: DISCONTINUED | OUTPATIENT
Start: 2019-02-08 | End: 2019-02-09 | Stop reason: HOSPADM

## 2019-02-08 RX ORDER — SODIUM CHLORIDE 9 MG/ML
INJECTION, SOLUTION INTRAVENOUS CONTINUOUS
Status: DISCONTINUED | OUTPATIENT
Start: 2019-02-08 | End: 2019-02-09 | Stop reason: HOSPADM

## 2019-02-08 RX ORDER — SODIUM CHLORIDE 0.9 % (FLUSH) 0.9 %
10 SYRINGE (ML) INJECTION EVERY 12 HOURS SCHEDULED
Status: DISCONTINUED | OUTPATIENT
Start: 2019-02-08 | End: 2019-02-09 | Stop reason: HOSPADM

## 2019-02-11 ENCOUNTER — HOSPITAL ENCOUNTER (OUTPATIENT)
Dept: SPEECH THERAPY | Age: 39
Setting detail: THERAPIES SERIES
Discharge: HOME | End: 2019-02-11
Payer: COMMERCIAL

## 2019-02-11 ENCOUNTER — HOSPITAL ENCOUNTER (OUTPATIENT)
Dept: OCCUPATIONAL THERAPY | Age: 39
Setting detail: THERAPIES SERIES
Discharge: HOME | End: 2019-02-11
Payer: COMMERCIAL

## 2019-02-11 DIAGNOSIS — I63.9 CEREBROVASCULAR ACCIDENT (CVA), UNSPECIFIED MECHANISM (HCC): ICD-10-CM

## 2019-02-11 PROCEDURE — 92507 TX SP LANG VOICE COMM INDIV: CPT

## 2019-02-11 PROCEDURE — 97112 NEUROMUSCULAR REEDUCATION: CPT

## 2019-02-11 PROCEDURE — 97110 THERAPEUTIC EXERCISES: CPT

## 2019-02-11 PROCEDURE — 97032 APPL MODALITY 1+ESTIM EA 15: CPT

## 2019-02-11 PROCEDURE — 97530 THERAPEUTIC ACTIVITIES: CPT

## 2019-02-13 ENCOUNTER — HOSPITAL ENCOUNTER (OUTPATIENT)
Dept: SPEECH THERAPY | Age: 39
Setting detail: THERAPIES SERIES
Discharge: HOME | End: 2019-02-13
Payer: COMMERCIAL

## 2019-02-13 ENCOUNTER — HOSPITAL ENCOUNTER (OUTPATIENT)
Dept: OCCUPATIONAL THERAPY | Age: 39
Setting detail: THERAPIES SERIES
Discharge: HOME | End: 2019-02-13
Payer: COMMERCIAL

## 2019-02-13 DIAGNOSIS — I63.9 CEREBROVASCULAR ACCIDENT (CVA), UNSPECIFIED MECHANISM (HCC): ICD-10-CM

## 2019-02-18 ENCOUNTER — ANTI-COAG VISIT (OUTPATIENT)
Dept: PHARMACY | Age: 39
End: 2019-02-18

## 2019-02-18 DIAGNOSIS — I63.9 CEREBROVASCULAR ACCIDENT (CVA), UNSPECIFIED MECHANISM (HCC): ICD-10-CM

## 2019-04-25 ENCOUNTER — APPOINTMENT (OUTPATIENT)
Dept: GENERAL RADIOLOGY | Age: 39
End: 2019-04-25
Payer: COMMERCIAL

## 2019-04-25 ENCOUNTER — HOSPITAL ENCOUNTER (EMERGENCY)
Age: 39
Discharge: HOME | End: 2019-04-25
Attending: EMERGENCY MEDICINE
Payer: COMMERCIAL

## 2019-04-25 VITALS
OXYGEN SATURATION: 100 % | TEMPERATURE: 97.4 F | HEIGHT: 69 IN | BODY MASS INDEX: 29.62 KG/M2 | HEART RATE: 78 BPM | RESPIRATION RATE: 15 BRPM | SYSTOLIC BLOOD PRESSURE: 137 MMHG | DIASTOLIC BLOOD PRESSURE: 82 MMHG | WEIGHT: 200 LBS

## 2019-04-25 DIAGNOSIS — M79.89 LEG SWELLING: ICD-10-CM

## 2019-04-25 DIAGNOSIS — S93.402A SPRAIN OF LEFT ANKLE, UNSPECIFIED LIGAMENT, INITIAL ENCOUNTER: ICD-10-CM

## 2019-04-25 DIAGNOSIS — M79.605 LEFT LEG PAIN: Primary | ICD-10-CM

## 2019-04-25 PROCEDURE — 97110 THERAPEUTIC EXERCISES: CPT

## 2019-04-25 PROCEDURE — 97161 PT EVAL LOW COMPLEX 20 MIN: CPT

## 2019-04-25 PROCEDURE — 99284 EMERGENCY DEPT VISIT MOD MDM: CPT

## 2019-04-25 PROCEDURE — 97116 GAIT TRAINING THERAPY: CPT

## 2019-04-25 PROCEDURE — 73630 X-RAY EXAM OF FOOT: CPT

## 2019-04-25 PROCEDURE — 93971 EXTREMITY STUDY: CPT

## 2019-04-25 PROCEDURE — 6370000000 HC RX 637 (ALT 250 FOR IP): Performed by: NURSE PRACTITIONER

## 2019-04-25 PROCEDURE — 73610 X-RAY EXAM OF ANKLE: CPT

## 2019-04-25 PROCEDURE — 73590 X-RAY EXAM OF LOWER LEG: CPT

## 2019-04-25 RX ORDER — OXYCODONE HYDROCHLORIDE AND ACETAMINOPHEN 5; 325 MG/1; MG/1
1 TABLET ORAL ONCE
Status: COMPLETED | OUTPATIENT
Start: 2019-04-25 | End: 2019-04-25

## 2019-04-25 RX ADMIN — OXYCODONE HYDROCHLORIDE AND ACETAMINOPHEN 1 TABLET: 5; 325 TABLET ORAL at 11:57

## 2019-04-25 ASSESSMENT — PAIN SCALES - GENERAL
PAINLEVEL_OUTOF10: 10
PAINLEVEL_OUTOF10: 10

## 2019-04-25 ASSESSMENT — ENCOUNTER SYMPTOMS
VOMITING: 0
COUGH: 0
SHORTNESS OF BREATH: 0
ABDOMINAL PAIN: 0
NAUSEA: 0

## 2019-04-25 ASSESSMENT — PAIN DESCRIPTION - DESCRIPTORS: DESCRIPTORS: CONSTANT;THROBBING

## 2019-04-25 ASSESSMENT — PAIN DESCRIPTION - LOCATION: LOCATION: LEG

## 2019-04-25 ASSESSMENT — PAIN DESCRIPTION - PAIN TYPE: TYPE: ACUTE PAIN

## 2019-04-25 ASSESSMENT — PAIN DESCRIPTION - ORIENTATION: ORIENTATION: LEFT;LOWER

## 2019-04-25 NOTE — ED PROVIDER NOTES
MEDICAL HISTORY     Past Medical History:   Diagnosis Date    [facility]     C[facility]bral infarction [facility]     Chronic back pain     Depression     Hyperlipidemia     Hypertension          SURGICAL HISTORY     Past Surgical History:   Procedure Laterality Date     SECTION      TONSILLECTOMY           CURRENTMEDICATIONS       Previous Medications    ASPIRIN 325 MG EC TABLET    Take 1 tablet by mouth daily    ATORVASTATIN (LIPITOR) 80 MG TABLET    Take 80 mg by mouth daily    CYANOCOBALAMIN 1000 MCG TABLET    Take 1,000 mcg by mouth    DONEPEZIL (ARICEPT) 5 MG TABLET    Take 5 mg by mouth nightly    FLUOXETINE (PROZAC) 20 MG CAPSULE    Take 20 mg by mouth daily    GABAPENTIN (NEURONTIN) 300 MG CAPSULE    Take 300 mg by mouth 3 times daily. Dewane Newness LISINOPRIL (PRINIVIL;ZESTRIL) 20 MG TABLET    Take 20 mg by mouth daily    OXYCODONE-ACETAMINOPHEN (PERCOCET)  MG PER TABLET    Take 1 tablet by mouth every 6 hours as needed. Dewane Newness     POLYETHYLENE GLYCOL (GLYCOLAX) PACKET    Take 17 g by mouth daily as needed for Constipation         ALLERGIES     Aminoglycosides and Ultram [tramadol]    FAMILYHISTORY       Family History   Problem Relation Age of Onset    Substance Abuse Father     Alcohol Abuse Father     Substance Abuse Brother     Cancer Maternal Aunt     Diabetes Maternal Grandmother     Heart Disease Maternal Grandmother     Stroke Maternal Grandmother           SOCIAL HISTORY       Social History     Socioeconomic History    Marital status: Single     Spouse name: None    Number of children: None    Years of education: None    Highest education level: None   Occupational History    None   Social Needs    Financial resource strain: None    Food insecurity:     Worry: None     Inability: None    Transportation needs:     Medical: None     Non-medical: None   Tobacco Use    Smoking status: Current Every Day Smoker     Packs/day: 1.00     Types: Cigarettes    Smokeless tobacco: Never Used   Substance and Sexual Activity    Alcohol use: No    Drug use: No    Sexual activity: Yes     Partners: Male   Lifestyle    Physical activity:     Days per week: None     Minutes per session: None    Stress: None   Relationships    Social connections:     Talks on phone: None     Gets together: None     Attends [employer] service: None     Active member of club or organization: None     Attends meetings of clubs or organizations: None     Relationship status: None    Intimate partner violence:     Fear of current or ex partner: None     Emotionally abused: None     Physically abused: None     Forced sexual activity: None   Other Topics Concern    None   Social History Narrative    None       SCREENINGS             PHYSICAL EXAM    (up to 7 for level 4, 8 or more for level 5)     ED Triage Vitals [04/25/19 1047]   BP Temp Temp Source Pulse Resp SpO2 Height Weight   137/82 97.4 °F (36.3 °C) Temporal 78 15 100 % 5' 9\" (1.753 m) 200 lb (90.7 kg)       Physical Exam   Constitutional: She appears well-developed and well-nourished. HENT:   Head: Normocephalic and atraumatic. Right Ear: External ear normal.   Left Ear: External ear normal.   Nose: Nose normal.   Eyes: Conjunctivae are normal.   Neck: Normal range of motion. Cardiovascular: Normal rate, regular rhythm, normal heart sounds and intact distal pulses. Pulmonary/Chest: Effort normal and breath sounds normal.   Abdominal: She exhibits no distension. Musculoskeletal:        Left ankle: She exhibits decreased range of motion and swelling. She exhibits normal pulse. Tenderness. Lateral malleolus tenderness found. Achilles tendon normal.        Left lower leg: She exhibits tenderness and swelling. She exhibits no bony tenderness. Neurological: She is alert. Skin: Skin is warm and dry. Capillary refill takes less than 2 seconds. Psychiatric: She has a normal mood and affect.  Her behavior is normal. Nursing note and vitals reviewed. DIAGNOSTIC RESULTS   LABS:    Labs Reviewed - No data to display    All other labs were within normal range or not returned as of this dictation. EKG: All EKG's are interpreted by the Emergency Department Physician who either signs orCo-signs this chart in the absence of a cardiologist.  Please see their note for interpretation of EKG. RADIOLOGY:   Non-plain film images such as CT, Ultrasound and MRI are read by the radiologist. Plain radiographic images are visualized andpreliminarily interpreted by the  ED Provider with the below findings:    Interpretation per theRadiologist below, if available at the time of this note:    XR TIBIA FIBULA LEFT (2 VIEWS)   Preliminary Result   Studies of the left tibia/fibula, left ankle and left foot demonstrate no   acute bony abnormalities. Nonspecific soft tissue swelling/subcutaneous edema about the distal   leg/ankle, predominantly laterally, as well as into the dorsal foot. No   radiopaque foreign bodies or soft tissue gas. XR FOOT LEFT (MIN 3 VIEWS)   Preliminary Result   Studies of the left tibia/fibula, left ankle and left foot demonstrate no   acute bony abnormalities. Nonspecific soft tissue swelling/subcutaneous edema about the distal   leg/ankle, predominantly laterally, as well as into the dorsal foot. No   radiopaque foreign bodies or soft tissue gas. XR ANKLE LEFT (MIN 3 VIEWS)   Preliminary Result   Studies of the left tibia/fibula, left ankle and left foot demonstrate no   acute bony abnormalities. Nonspecific soft tissue swelling/subcutaneous edema about the distal   leg/ankle, predominantly laterally, as well as into the dorsal foot. No   radiopaque foreign bodies or soft tissue gas. VL Extremity Venous Left           No results found.       PROCEDURES   Unless otherwise noted below, none     Procedures    CRITICAL CARE TIME   N/A    CONSULTS:  None      EMERGENCY DEPARTMENT COURSE and DIFFERENTIALDIAGNOSIS/MDM:   Vitals:    Vitals:    04/25/19 1047   BP: 137/82   Pulse: 78   Resp: 15   Temp: 97.4 °F (36.3 °C)   TempSrc: Temporal   SpO2: 100%   Weight: 200 lb (90.7 kg)   Height: 5' 9\" (1.753 m)       Patient was given thefollowing medications:  Medications   oxyCODONE-acetaminophen (PERCOCET) 5-325 MG per tablet 1 tablet (1 tablet Oral Given 4/25/19 1157)       Patient presented to the emergency department with complaints of left leg pain and swelling. Her symptoms started after she stumbled on a couple of steps yesterday. Physical exam did reveal significant swelling to the lateral portion of her left leg. She was tender in her lateral malleolus and left calf. He is somewhat warm to palpation. No erythema. X-ray tib-fib and ankle and foot showed no acute bony abnormalities. Vascular study showed no acute DVT. I suspect this is a significant sprain. Patient our he takes Percocet for pain and was instructed to continue taking this. I did have physical therapy work with the patient as the patient does have a right-sided deficit from her CVA. Patient will be discharged with instructions to follow-up with orthopedics next several days. She is to return to the emergency department for any worsening of symptoms. I discussed treatment plan with patient, patient is agreeable and denies questions at this time. The patient tolerated their visit well. They were seen and evaluated by the attending physician, No att. providers found who agreed with the assessment and plan. The patientand / or the family were informed of the results of any tests, a time was given to answer questions, a plan was proposed and they agreed with plan. FINAL IMPRESSION      1. Left leg pain    2. Leg swelling    3.  Sprain of left ankle, unspecified ligament, initial encounter          DISPOSITION/PLAN   DISPOSITION        PATIENT REFERRED TO:  Zachery Norris  [address] [place] [address]  [phone]  Schedule an appointment as soon as possible for a visit in 1 week  For follow up care    [facility]  [address] [facility]  Go to   As needed, If symptoms worsen      DISCHARGE MEDICATIONS:  New Prescriptions    No medications on file       DISCONTINUED MEDICATIONS:  Discontinued Medications    No medications on file              (Please note that portions ofthis note were completed with a voice recognition program.  Efforts were made to edit the dictations but occasionally words are mis-transcribed.)    BEATRIS Oscar CNP (electronically signed)           BEATRIS Oscar CNP  04/25/19 6540

## 2019-04-25 NOTE — ED NOTES
Outpatient Physical Therapy Phone: [phone], Fax: [phone]   ED Physical Therapy Phone: [phone]    FUNCTIONAL MOBILITY PHYSICAL THERAPY EVALUATION  EMERGENCY DEPARTMENT     Received referral for Physical Therapy Evaluation in the Emergency Department. Pt educated to role of PT in the ED, and pt agreeable to proceed with evaluation. Evaluation Date: 4/25/2019   Patient Name: Phuc Wallis   YOB: 1980    Medical Diagnosis:  Leg pain  Treatment Diagnosis:  L ankle pain and dysfunction  Onset Date:  4/24/19    Referral Date: 4/25/2019  Referring Provider: Vernell Angulo CNP  Restrictions/Precautions:  R sided weakness from previous CVA    SUBJECTIVE FINDINGS    History of Present Illness:    Pt reports that she was going outside and fell on the porch. She fell while going down the steps. She thinks that she landed on her leg. This happened yesterday morning. She was limping around all day yesterday and furniture walking. Pt was walking without a device prior to fall and notes that she was able to get up an walk today, although it hurts her to bear weight. Pt reports that she had a CVA a little over a year - was walking but not using R arm. She is able to move her arm but is unable to  or  many things. A&Ox4, patient appropriate and cooperative. Follows 1 step and 2 step commands. Preadmission Environment and PLOF   Pt. Lives with boyfriend and kids (2 kids: 8 y.o boys)  Home environment:  Trailer with 4 ALEK. Steps to enter first floor: 4 ALEK    Bath: pt has both tub/shower and a walk in shower - pt stands while in the [facility] owned: none  Home services: none    Pt. Able to drive   ? Yes x   No (Not since CVA)    Pt. Able to perform all ADLs without assistance  x   Yes ? No  (Comment below): pt is able to dress and bathe indep, care for kids  Pt. Fully independent for transfers without AD  x   Yes ? No  (Comment below):     Has the pt fallen in the SPC in RUE. Functional Mobility    Transitional Movement Assistance Level   Rolling to left side  ? NT        ? Indep    ? Sup/SBA    ? CGA   ? Min A   ? Mod A   ? Max A         ? Total A   Rolling to right side  ? NT        ? Indep    ? Sup/SBA    ? CGA   ? Min A   ? Mod A   ? Max A         ? Total A   Scooting up in bed  ? NT        ? Indep    ? Sup/SBA    ? CGA   ? Min A   ? Mod A   ? Max A         ? Total A   Scooting down in bed  ? NT        ? Indep    ? Sup/SBA    ? CGA   ? Min A   ? Mod A   ? Max A         ? Total A   Supine to sit  ? NT        ? Indep    ? Sup/SBA    ? CGA   ? Min A   ? Mod A   ? Max A         ? Total A   Sit to supine  ? NT        ? Indep    ? Sup/SBA    ? CGA   ? Min A   ? Mod A   ? Max A         ? Total A   Sit to stand  ? NT        ? Indep    ? Sup/SBA    ? CGA   ? Min A   ? Mod A   ? Max A         ? Total A   Stand to sit  ? NT        ? Indep    ? Sup/SBA    ? CGA   ? Min A   ? Mod A   ? Max A         ? Total A   Bed to chair transfer  ? NT        ? Indep    ? Sup/SBA    ? CGA   ? Min A   ? Mod A   ? Max A         ? Total A   Toilet transfer  ? NT        ? Indep    ? Sup/SBA    ? CGA   ? Min A   ? Mod A   ? Max A         ? Total A   Tub transfer  ? NT        ? Indep    ? Sup/SBA    ? CGA   ? Min A   ? Mod A   ? Max A         ? Total A   Car transfer  ? NT        ? Indep    ? Sup/SBA    ? CGA   ? Min A   ? Mod A   ? Max A         ? Total A     Balance     ? Balance WNL except as marked below  Static Sitting:   Dynamic Sitting:   Static Stance: tandem - unable to maintain tandem hold, 5 sec B with modified tandem stance     Gait   Assistance Level:  SBA  Device:  No device and SPC  Orthotics:        Deviations:  Pt ambulated with WBOS, waddling sequence, no antalgic patterns, minimal hip extension and DF on LLE. Pt with improved ambulation when using SPC in LUE: fluid sequence, more confidence.   Distance:  50'x4       TREATMENT  Educated on anatomy, physiology, and biomechanics of ankle joint, edema and effusion, and gait sequence with SPC. Pt verbalized understanding and all of patient's questions answered to satisfaction. Therex: Ankle pumps, quad sets, glut sets, heel slides    Gait/Transfer Training: Ambulation with SPC: sequence, glut activation, stride length       Pt response to treatment: Pt responded well to treatment with improved confidence to return home. Pt receptive to using SPC until swelling and pain decrease in LLE. ASSESSMENT    Pt presenting to ED with c/o L leg/ankle pain. Through evaluation and examination, identified decreased ROM L ankle and foot leading to altered gait mechanics with poor sequencing. Pt also demonstrated slight RLE deficits due to previous CVA but these deficits are minimal the this time. RLE able to support increased WB and functional needs at this time. PT recommending L ankle splint/aircast to provided stabilization and compression. Follow up with orthopaedics if symptoms continue, but at this time, pt is safe to return home with initial assistance as needed from family members and friends. Discussed findings and recommendations with referring provider. PLAN OF CARE    One time visit in the ED. Thank you for the referral of this patient.      Timed Code Treatment Minutes:  25  minutes   Total Treatment Time:  35  minutes    Pastor Hargrove PT license #[id number]

## 2019-09-04 ENCOUNTER — HOSPITAL ENCOUNTER (OUTPATIENT)
Dept: PHYSICAL THERAPY | Age: 39
Setting detail: THERAPIES SERIES
Discharge: HOME | End: 2019-09-04
Payer: COMMERCIAL

## 2019-09-04 PROCEDURE — 97750 PHYSICAL PERFORMANCE TEST: CPT

## 2020-06-23 ENCOUNTER — HOSPITAL ENCOUNTER (OUTPATIENT)
Dept: GENERAL RADIOLOGY | Age: 40
Discharge: HOME | End: 2020-06-23
Payer: COMMERCIAL

## 2020-06-23 ENCOUNTER — HOSPITAL ENCOUNTER (OUTPATIENT)
Age: 40
Discharge: HOME | End: 2020-06-23
Payer: COMMERCIAL

## 2020-06-23 PROCEDURE — 73030 X-RAY EXAM OF SHOULDER: CPT

## 2020-07-28 ENCOUNTER — APPOINTMENT (OUTPATIENT)
Dept: CT IMAGING | Age: 40
End: 2020-07-28
Payer: COMMERCIAL

## 2020-07-28 ENCOUNTER — APPOINTMENT (OUTPATIENT)
Dept: GENERAL RADIOLOGY | Age: 40
End: 2020-07-28
Payer: COMMERCIAL

## 2020-07-28 ENCOUNTER — HOSPITAL ENCOUNTER (EMERGENCY)
Age: 40
Discharge: HOME | End: 2020-07-28
Attending: EMERGENCY MEDICINE
Payer: COMMERCIAL

## 2020-07-28 VITALS
TEMPERATURE: 98.8 F | WEIGHT: 200 LBS | OXYGEN SATURATION: 100 % | HEIGHT: 69 IN | DIASTOLIC BLOOD PRESSURE: 81 MMHG | SYSTOLIC BLOOD PRESSURE: 121 MMHG | HEART RATE: 74 BPM | RESPIRATION RATE: 16 BRPM | BODY MASS INDEX: 29.62 KG/M2

## 2020-07-28 LAB
A/G RATIO: 1.3 (ref 1.1–2.2)
ALBUMIN SERPL-MCNC: 4 G/DL (ref 3.4–5)
ALP BLD-CCNC: 74 U/L (ref 40–129)
ALT SERPL-CCNC: 9 U/L (ref 10–40)
ANION GAP SERPL CALCULATED.3IONS-SCNC: 11 MMOL/L (ref 3–16)
AST SERPL-CCNC: 13 U/L (ref 15–37)
BACTERIA: ABNORMAL /HPF
BASOPHILS ABSOLUTE: 0.1 K/UL (ref 0–0.2)
BASOPHILS RELATIVE PERCENT: 0.9 %
BILIRUB SERPL-MCNC: 0.4 MG/DL (ref 0–1)
BILIRUBIN URINE: NEGATIVE
BLOOD, URINE: ABNORMAL
BUN BLDV-MCNC: 9 MG/DL (ref 7–20)
CALCIUM SERPL-MCNC: 9.3 MG/DL (ref 8.3–10.6)
CHLORIDE BLD-SCNC: 105 MMOL/L (ref 99–110)
CLARITY: ABNORMAL
CO2: 22 MMOL/L (ref 21–32)
COLOR: YELLOW
CREAT SERPL-MCNC: 0.8 MG/DL (ref 0.6–1.1)
EKG ATRIAL RATE: 76 BPM
EKG DIAGNOSIS: NORMAL
EKG P AXIS: 60 DEGREES
EKG P-R INTERVAL: 166 MS
EKG Q-T INTERVAL: 382 MS
EKG QRS DURATION: 86 MS
EKG QTC CALCULATION (BAZETT): 429 MS
EKG R AXIS: 36 DEGREES
EKG T AXIS: 40 DEGREES
EKG VENTRICULAR RATE: 76 BPM
EOSINOPHILS ABSOLUTE: 0.1 K/UL (ref 0–0.6)
EOSINOPHILS RELATIVE PERCENT: 0.6 %
EPITHELIAL CELLS, UA: ABNORMAL /HPF (ref 0–5)
GFR AFRICAN AMERICAN: >60
GFR NON-AFRICAN AMERICAN: >60
GLOBULIN: 3 G/DL
GLUCOSE BLD-MCNC: 106 MG/DL (ref 70–99)
GLUCOSE BLD-MCNC: 110 MG/DL (ref 70–99)
GLUCOSE URINE: NEGATIVE MG/DL
HCG(URINE) PREGNANCY TEST: NEGATIVE
HCT VFR BLD CALC: 46.9 % (ref 36–48)
HEMOGLOBIN: 15.9 G/DL (ref 12–16)
INR BLD: 1.03 (ref 0.86–1.14)
KETONES, URINE: NEGATIVE MG/DL
LACTIC ACID: 2.3 MMOL/L (ref 0.4–2)
LEUKOCYTE ESTERASE, URINE: NEGATIVE
LIPASE: 75 U/L (ref 13–60)
LYMPHOCYTES ABSOLUTE: 2.5 K/UL (ref 1–5.1)
LYMPHOCYTES RELATIVE PERCENT: 25.1 %
MCH RBC QN AUTO: 31.9 PG (ref 26–34)
MCHC RBC AUTO-ENTMCNC: 33.9 G/DL (ref 31–36)
MCV RBC AUTO: 94 FL (ref 80–100)
MICROSCOPIC EXAMINATION: YES
MONOCYTES ABSOLUTE: 0.5 K/UL (ref 0–1.3)
MONOCYTES RELATIVE PERCENT: 5.2 %
MUCUS: ABNORMAL /LPF
NEUTROPHILS ABSOLUTE: 6.8 K/UL (ref 1.7–7.7)
NEUTROPHILS RELATIVE PERCENT: 68.2 %
NITRITE, URINE: NEGATIVE
PDW BLD-RTO: 14 % (ref 12.4–15.4)
PERFORMED ON: ABNORMAL
PH UA: 6 (ref 5–8)
PLATELET # BLD: 196 K/UL (ref 135–450)
PMV BLD AUTO: 10.6 FL (ref 5–10.5)
POTASSIUM SERPL-SCNC: 3.6 MMOL/L (ref 3.5–5.1)
PROTEIN UA: NEGATIVE MG/DL
PROTHROMBIN TIME: 11.9 SEC (ref 10–13.2)
RBC # BLD: 4.98 M/UL (ref 4–5.2)
RBC UA: ABNORMAL /HPF (ref 0–4)
SEDIMENTATION RATE, ERYTHROCYTE: 10 MM/HR (ref 0–20)
SODIUM BLD-SCNC: 138 MMOL/L (ref 136–145)
SPECIFIC GRAVITY UA: 1.02 (ref 1–1.03)
TOTAL PROTEIN: 7 G/DL (ref 6.4–8.2)
TROPONIN: <0.01 NG/ML
URINE TYPE: ABNORMAL
UROBILINOGEN, URINE: 0.2 E.U./DL
WBC # BLD: 10 K/UL (ref 4–11)
WBC UA: ABNORMAL /HPF (ref 0–5)

## 2020-07-28 PROCEDURE — 6360000004 HC RX CONTRAST MEDICATION: Performed by: EMERGENCY MEDICINE

## 2020-07-28 PROCEDURE — 93010 ELECTROCARDIOGRAM REPORT: CPT | Performed by: INTERNAL MEDICINE

## 2020-07-28 PROCEDURE — 84703 CHORIONIC GONADOTROPIN ASSAY: CPT

## 2020-07-28 PROCEDURE — 70450 CT HEAD/BRAIN W/O DYE: CPT

## 2020-07-28 PROCEDURE — 70498 CT ANGIOGRAPHY NECK: CPT

## 2020-07-28 PROCEDURE — 71045 X-RAY EXAM CHEST 1 VIEW: CPT

## 2020-07-28 PROCEDURE — 85025 COMPLETE CBC W/AUTO DIFF WBC: CPT

## 2020-07-28 PROCEDURE — 36415 COLL VENOUS BLD VENIPUNCTURE: CPT

## 2020-07-28 PROCEDURE — 81001 URINALYSIS AUTO W/SCOPE: CPT

## 2020-07-28 PROCEDURE — 80053 COMPREHEN METABOLIC PANEL: CPT

## 2020-07-28 PROCEDURE — 87040 BLOOD CULTURE FOR BACTERIA: CPT

## 2020-07-28 PROCEDURE — 84484 ASSAY OF TROPONIN QUANT: CPT

## 2020-07-28 PROCEDURE — 83690 ASSAY OF LIPASE: CPT

## 2020-07-28 PROCEDURE — 93005 ELECTROCARDIOGRAM TRACING: CPT | Performed by: EMERGENCY MEDICINE

## 2020-07-28 PROCEDURE — 85610 PROTHROMBIN TIME: CPT

## 2020-07-28 PROCEDURE — 85652 RBC SED RATE AUTOMATED: CPT

## 2020-07-28 PROCEDURE — 2580000003 HC RX 258: Performed by: EMERGENCY MEDICINE

## 2020-07-28 PROCEDURE — 99284 EMERGENCY DEPT VISIT MOD MDM: CPT

## 2020-07-28 PROCEDURE — 83605 ASSAY OF LACTIC ACID: CPT

## 2020-07-28 RX ORDER — SODIUM CHLORIDE 9 MG/ML
INJECTION, SOLUTION INTRAVENOUS CONTINUOUS
Status: DISCONTINUED | OUTPATIENT
Start: 2020-07-28 | End: 2020-07-28 | Stop reason: HOSPADM

## 2020-07-28 RX ADMIN — IOPAMIDOL 75 ML: 755 INJECTION, SOLUTION INTRAVENOUS at 14:25

## 2020-07-28 RX ADMIN — SODIUM CHLORIDE: 9 INJECTION, SOLUTION INTRAVENOUS at 14:43

## 2020-07-28 ASSESSMENT — ENCOUNTER SYMPTOMS
SHORTNESS OF BREATH: 0
CHOKING: 0
CONSTIPATION: 0
STRIDOR: 0
ABDOMINAL PAIN: 0
NAUSEA: 0
DIARRHEA: 0
WHEEZING: 0
VOMITING: 0
COUGH: 0
CHEST TIGHTNESS: 0

## 2020-07-28 NOTE — ED PROVIDER NOTES
Notes were reviewed. REVIEW OF SYSTEMS    (2-9 systems for level 4, 10 or more for level 5)     Review of Systems   Constitutional: Positive for activity change and fatigue. Negative for appetite change, chills, diaphoresis and fever. HENT: Negative for congestion and ear pain. Respiratory: Negative for cough, choking, chest tightness, shortness of breath, wheezing and stridor. Cardiovascular: Negative for chest pain, palpitations and leg swelling. Gastrointestinal: Negative for abdominal pain, constipation, diarrhea, nausea and vomiting. Genitourinary: Negative for difficulty urinating, flank pain, frequency and pelvic pain. Neurological: Positive for facial asymmetry and speech difficulty. Negative for dizziness, tremors, seizures, syncope, weakness, light-headedness, numbness and headaches. All other systems reviewed and are negative. Except as noted above the remainder of the review of systems was reviewed and negative. PAST MEDICAL HISTORY     Past Medical History:   Diagnosis Date    Cerebral artery occlusion with cerebral infarction ([address]     Cerebral [facility] [address] [address] [facility]     Chronic back pain     Depression     Hyperlipidemia     Hypertension          SURGICAL HISTORY       Past Surgical History:   Procedure Laterality Date     SECTION      TONSILLECTOMY           CURRENT MEDICATIONS       Previous Medications    ASPIRIN 325 MG EC TABLET    Take 1 tablet by mouth daily    ATORVASTATIN (LIPITOR) 80 MG TABLET    Take 80 mg by mouth daily    CYANOCOBALAMIN 1000 MCG TABLET    Take 1,000 mcg by mouth    DONEPEZIL (ARICEPT) 5 MG TABLET    Take 5 mg by mouth nightly    GABAPENTIN (NEURONTIN) 300 MG CAPSULE    Take 300 mg by mouth 3 times daily. Naya Ingham LISINOPRIL (PRINIVIL;ZESTRIL) 20 MG TABLET    Take 20 mg by mouth daily    OXYCODONE-ACETAMINOPHEN (PERCOCET)  MG PER TABLET    Take 1 tablet by mouth every 6 hours as needed. Naya Edwin     POLYETHYLENE GLYCOL (GLYCOLAX) PACKET Take 17 g by mouth daily as needed for Constipation       ALLERGIES     Aminoglycosides and Ultram [tramadol]    FAMILY HISTORY       Family History   Problem Relation Age of Onset    Substance Abuse Father     Alcohol Abuse Father     Substance Abuse Brother     Cancer Maternal Aunt     Diabetes Maternal Grandmother     Heart Disease Maternal Grandmother     Stroke Maternal Grandmother           SOCIAL HISTORY       Social History     Socioeconomic History    Marital status: Single     Spouse name: Not on file    Number of children: Not on file    Years of education: Not on file    Highest education level: Not on file   Occupational History    Not on file   Social Needs    Financial resource strain: Not on file    Food insecurity     Worry: Not on file     Inability: Not on file    Transportation needs     Medical: Not on file     Non-medical: Not on file   Tobacco Use    Smoking status: Current Every Day Smoker     Packs/day: 1.00     Types: Cigarettes    Smokeless tobacco: Never Used   Substance and Sexual Activity    Alcohol use: No    Drug use: No    Sexual activity: Yes     Partners: Male   Lifestyle    Physical activity     Days per week: Not on file     Minutes per session: Not on file    Stress: Not on file   Relationships    Social connections     Talks on phone: Not on file     Gets together: Not on file     Attends Yazidi service: Not on file     Active member of club or organization: Not on file     Attends meetings of clubs or organizations: Not on file     Relationship status: Not on file    Intimate partner violence     Fear of current or ex partner: Not on file     Emotionally abused: Not on file     Physically abused: Not on file     Forced sexual activity: Not on file   Other Topics Concern    Not on file   Social History Narrative    Not on file       SCREENINGS    [place] Coma Scale  Eye Opening: Spontaneous  Best Verbal Response: Oriented  Best Motor Response: Obeys Absolute 0.5 0.0 - 1.3 K/uL    Eosinophils Absolute 0.1 0.0 - 0.6 K/uL    Basophils Absolute 0.1 0.0 - 0.2 K/uL   Comprehensive Metabolic Panel   Result Value Ref Range    Sodium 138 136 - 145 mmol/L    Potassium 3.6 3.5 - 5.1 mmol/L    Chloride 105 99 - 110 mmol/L    CO2 22 21 - 32 mmol/L    Anion Gap 11 3 - 16    Glucose 110 (H) 70 - 99 mg/dL    BUN 9 7 - 20 mg/dL    CREATININE 0.8 0.6 - 1.1 mg/dL    GFR Non-African American >60 >60    GFR African American >60 >60    Calcium 9.3 8.3 - 10.6 mg/dL    Total Protein 7.0 6.4 - 8.2 g/dL    Alb 4.0 3.4 - 5.0 g/dL    Albumin/Globulin Ratio 1.3 1.1 - 2.2    Total Bilirubin 0.4 0.0 - 1.0 mg/dL    Alkaline Phosphatase 74 40 - 129 U/L    ALT 9 (L) 10 - 40 U/L    AST 13 (L) 15 - 37 U/L    Globulin 3.0 g/dL   Protime-INR   Result Value Ref Range    Protime 11.9 10.0 - 13.2 sec    INR 1.03 0.86 - 1.14   Sedimentation Rate   Result Value Ref Range    Sed Rate 10 0 - 20 mm/Hr   Urinalysis   Result Value Ref Range    Color, UA Yellow Straw/Yellow    Clarity, UA SL CLOUDY (A) Clear    Glucose, Ur Negative Negative mg/dL    Bilirubin Urine Negative Negative    Ketones, Urine Negative Negative mg/dL    Specific Gravity, UA 1.020 1.005 - 1.030    Blood, Urine SMALL (A) Negative    pH, UA 6.0 5.0 - 8.0    Protein, UA Negative Negative mg/dL    Urobilinogen, Urine 0.2 <2.0 E.U./dL    Nitrite, Urine Negative Negative    Leukocyte Esterase, Urine Negative Negative    Microscopic Examination YES     Urine Type NotGiven    Pregnancy, Urine   Result Value Ref Range    HCG(Urine) Pregnancy Test Negative Detects HCG level >20 MIU/mL   Lipase   Result Value Ref Range    Lipase 75.0 (H) 13.0 - 60.0 U/L   Troponin   Result Value Ref Range    Troponin <0.01 <0.01 ng/mL   Lactic Acid, Plasma   Result Value Ref Range    Lactic Acid 2.3 (H) 0.4 - 2.0 mmol/L   Microscopic Urinalysis   Result Value Ref Range    Mucus, UA Rare (A) None Seen /LPF    WBC, UA 0-2 0 - 5 /HPF    RBC, UA 0-2 0 - 4 /HPF Epithelial Cells, UA 2-5 0 - 5 /HPF    Bacteria, UA Rare (A) None Seen /HPF   POCT Glucose   Result Value Ref Range    POC Glucose 106 (H) 70 - 99 mg/dl    Performed on ACCU-CHEK    EKG 12 Lead   Result Value Ref Range    Ventricular Rate 76 BPM    Atrial Rate 76 BPM    P-R Interval 166 ms    QRS Duration 86 ms    Q-T Interval 382 ms    QTc Calculation (Bazett) 429 ms    P Axis 60 degrees    R Axis 36 degrees    T Axis 40 degrees    Diagnosis       Normal sinus rhythmCannot rule out Anterior infarct , age undeterminedAbnormal ECGNo previous ECGs available            EMERGENCY DEPARTMENT COURSE and DIFFERENTIAL DIAGNOSIS/MDM:     Vitals:    07/28/20 1339 07/28/20 1430   BP: 128/65 127/78   Pulse: 73 75   Resp: 16 16   Temp: 98.8 °F (37.1 °C)    TempSrc: Oral    SpO2: 98% 100%   Weight: 200 lb (90.7 kg)    Height: 5' 9\" (1.753 m)            MDM      REASSESSMENT      Patient was thus felt to have some generalized weakness    At 4:15 PM she said I feel good now I just want to go home I do know she could have had a seizure or if this was just may be some lethargy following a long car ride from yesterday at this point I see no signs of any acute life threat she is discharged home asymptomatic back to her baseline the boyfriend feels comfortable taking her home she advised to return if any worsening  CRITICAL CARE TIME     CONSULTS:  None      PROCEDURES:     Procedures    MEDICATIONS GIVEN THIS VISIT:  Medications   0.9 % sodium chloride infusion ( Intravenous New Bag 7/28/20 1443)   iopamidol (ISOVUE-370) 76 % injection 75 mL (75 mLs Intravenous Given 7/28/20 1425)        FINAL IMPRESSION      1. General weakness            DISPOSITION/PLAN   DISPOSITION Decision To Discharge 07/28/2020 04:20:41 PM      PATIENT REFERRED TO:  Francesco Ya, [facility] [address] [id number] [phone]    In 3 days  As needed    [facility] Emergency Department  [address] [place] [address]

## 2020-07-29 ENCOUNTER — CARE COORDINATION (OUTPATIENT)
Dept: CARE COORDINATION | Age: 40
End: 2020-07-29

## 2020-07-29 NOTE — CARE COORDINATION
Patient's preferred phone number: na  Based on Loop alert triggers, patient will be contacted by nurse care manager for worsening symptoms. Plan for follow-up call in 5-7 days based on severity of symptoms and risk factors. Reports is feeling better today and plans to call her Dr for ER fu later.

## 2020-08-01 LAB — BLOOD CULTURE, ROUTINE: NORMAL

## 2020-08-06 ENCOUNTER — CARE COORDINATION (OUTPATIENT)
Dept: CARE COORDINATION | Age: 40
End: 2020-08-06

## 2020-08-06 NOTE — CARE COORDINATION
Patient contacted regarding COVID-19 risk and screening. Discussed COVID-19 related testing which was not done at this time. Test results were not done. Patient informed of results, if available? No.    Care Transition Nurse/ Ambulatory Care Manager contacted the patient by telephone to perform follow-up assessment. Verified name and  with patient as identifiers. Patient has following risk factors of: immunocompromised. Symptoms reviewed with patient who verbalized the following symptoms: no new symptoms and no worsening symptoms. Due to no new or worsening symptoms encounter was not routed to provider for escalation. Education provided regarding infection prevention, and signs and symptoms of COVID-19 and when to seek medical attention with patient who verbalized understanding. Discussed exposure protocols and quarantine from [address] you at higher risk for severe illness  and given an opportunity for questions and concerns. The patient agrees to contact the COVID-19 hotline [phone] or PCP office for questions related to their healthcare. CTN/ACM provided contact information for future reference. From CDC: Are you at higher risk for severe illness?  Wash your hands often.  Avoid close contact (6 feet, which is about two arm lengths) with people who are sick.  Put distance between yourself and other people if COVID-19 is spreading in your community.  Clean and disinfect frequently touched surfaces.  Avoid all cruise travel and non-essential air travel.  Call your healthcare professional if you have concerns about COVID-19 and your underlying condition or if you are sick. For more information on steps you can take to protect yourself, see CDC's How to [place] for follow-up call in 7-14 days based on severity of symptoms and risk factors. Reports no complaints at this time.

## 2020-08-17 ENCOUNTER — CARE COORDINATION (OUTPATIENT)
Dept: CARE COORDINATION | Age: 40
End: 2020-08-17

## 2021-03-09 ENCOUNTER — HOSPITAL ENCOUNTER (EMERGENCY)
Age: 41
Discharge: HOME | End: 2021-03-09
Attending: EMERGENCY MEDICINE
Payer: COMMERCIAL

## 2021-03-09 VITALS
BODY MASS INDEX: 30.31 KG/M2 | WEIGHT: 200 LBS | RESPIRATION RATE: 16 BRPM | SYSTOLIC BLOOD PRESSURE: 115 MMHG | TEMPERATURE: 98.8 F | HEART RATE: 89 BPM | DIASTOLIC BLOOD PRESSURE: 74 MMHG | HEIGHT: 68 IN | OXYGEN SATURATION: 100 %

## 2021-03-09 DIAGNOSIS — R30.0 DYSURIA: Primary | ICD-10-CM

## 2021-03-09 LAB
BACTERIA: ABNORMAL /HPF
BILIRUBIN URINE: NEGATIVE
BLOOD, URINE: ABNORMAL
CLARITY: ABNORMAL
COLOR: YELLOW
EPITHELIAL CELLS, UA: ABNORMAL /HPF (ref 0–5)
GLUCOSE URINE: NEGATIVE MG/DL
HCG(URINE) PREGNANCY TEST: NEGATIVE
KETONES, URINE: NEGATIVE MG/DL
LEUKOCYTE ESTERASE, URINE: NEGATIVE
MICROSCOPIC EXAMINATION: YES
MUCUS: ABNORMAL /LPF
NITRITE, URINE: NEGATIVE
PH UA: 5 (ref 5–8)
PROTEIN UA: NEGATIVE MG/DL
RBC UA: ABNORMAL /HPF (ref 0–4)
SPECIFIC GRAVITY UA: >=1.03 (ref 1–1.03)
URINE TYPE: ABNORMAL
UROBILINOGEN, URINE: 1 E.U./DL
WBC UA: ABNORMAL /HPF (ref 0–5)

## 2021-03-09 PROCEDURE — 84703 CHORIONIC GONADOTROPIN ASSAY: CPT

## 2021-03-09 PROCEDURE — 81001 URINALYSIS AUTO W/SCOPE: CPT

## 2021-03-09 PROCEDURE — 99284 EMERGENCY DEPT VISIT MOD MDM: CPT

## 2021-03-09 ASSESSMENT — ENCOUNTER SYMPTOMS
ABDOMINAL PAIN: 0
BACK PAIN: 0
CHEST TIGHTNESS: 0
SHORTNESS OF BREATH: 0

## 2021-03-09 NOTE — ED PROVIDER NOTES
[facility]      Pt Name: Cb Riggs  MRN: [id number]  [place] 1980  Date of evaluation: 3/9/2021  Provider: Lisbet Singh MD    [address] [place] [address]       Chief Complaint   Patient presents with    Urinary Tract Infection     pt c/o burning and pain when urinating         HISTORY OF PRESENT ILLNESS   (Location/Symptom, Timing/Onset, Context/Setting, Quality, Duration, Modifying Factors, Severity)  Note limiting factors. Cb Riggs is a 37 y.o. female who presents to the emergency department     Patient complains of about a 3 to 4-day history of some dysuria. She denies any flank pain  Denies fever  Denies abdominal pain  Patient has had a previous CVA in the past with craniotomy  Patient has no really no other complaints at this point her vital signs are reviewed and are totally stable  Abdomen is totally soft  No rashes noted    The history is provided by the patient. Nursing Notes were reviewed. REVIEW OF SYSTEMS    (2-9 systems for level 4, 10 or more for level 5)     Review of Systems   Constitutional: Positive for activity change. Negative for chills and fever. Respiratory: Negative for chest tightness and shortness of breath. Cardiovascular: Negative for chest pain. Gastrointestinal: Negative for abdominal pain. Genitourinary: Positive for dysuria. Negative for difficulty urinating, dyspareunia, flank pain, urgency, vaginal bleeding, vaginal discharge and vaginal pain. Musculoskeletal: Negative for back pain and neck pain. Allergic/Immunologic: Negative for immunocompromised state. Neurological: Negative for dizziness and light-headedness. Hematological: Negative for adenopathy. Psychiatric/Behavioral: Negative for behavioral problems. All other systems reviewed and are negative. Except as noted above the remainder of the review of systems was reviewed and negative.        PAST MEDICAL HISTORY     Past Medical History:   Diagnosis Date    [facility]bral [facility] occlusion [facility]     Cerebral infarction ([facility]     Chronic back pain     Depression     Hyperlipidemia     Hypertension          SURGICAL HISTORY       Past Surgical History:   Procedure Laterality Date     SECTION      TONSILLECTOMY           CURRENT MEDICATIONS       Previous Medications    ASPIRIN 325 MG EC TABLET    Take 1 tablet by mouth daily    ATORVASTATIN (LIPITOR) 80 MG TABLET    Take 80 mg by mouth daily    CYANOCOBALAMIN 1000 MCG TABLET    Take 1,000 mcg by mouth    DONEPEZIL (ARICEPT) 5 MG TABLET    Take 5 mg by mouth nightly    GABAPENTIN (NEURONTIN) 300 MG CAPSULE    Take 300 mg by mouth 3 times daily. Vonzella Goodpasture LISINOPRIL (PRINIVIL;ZESTRIL) 20 MG TABLET    Take 20 mg by mouth daily    OXYCODONE-ACETAMINOPHEN (PERCOCET)  MG PER TABLET    Take 1 tablet by mouth every 6 hours as needed. Vonzella Goodpasture POLYETHYLENE GLYCOL (GLYCOLAX) PACKET    Take 17 g by mouth daily as needed for Constipation       ALLERGIES     Aminoglycosides and Ultram [tramadol]    FAMILY HISTORY       Family History   Problem Relation Age of Onset    Substance Abuse Father     Alcohol Abuse Father     Substance Abuse Brother     Cancer Maternal Aunt     Diabetes Maternal Grandmother     Heart Disease Maternal Grandmother     Stroke Maternal Grandmother           SOCIAL HISTORY       Social History     Socioeconomic History    Marital status: Single     Spouse name: None    Number of children: None    Years of education: None    Highest education level: None   Occupational History    None   Social Needs    Financial resource strain: None    Food insecurity     Worry: None     Inability: None    Transportation needs     Medical: None     Non-medical: None   Tobacco Use    Smoking status: Current Every Day Smoker     Packs/day: 1.00     Types: Cigarettes    Smokeless tobacco: Never Used   Substance and Sexual Activity    Alcohol use:  No  Drug use: No    Sexual activity: Yes     Partners: Male   Lifestyle    Physical activity     Days per week: None     Minutes per session: None    Stress: None   Relationships    Social connections     Talks on phone: None     Gets together: None     Attends Judaism service: None     Active member of club or organization: None     Attends meetings of clubs or organizations: None     Relationship status: None    Intimate partner violence     Fear of current or ex partner: None     Emotionally abused: None     Physically abused: None     Forced sexual activity: None   Other Topics Concern    None   Social History Narrative    None       SCREENINGS    [place] Coma Scale  Eye Opening: Spontaneous  Best Verbal Response: Oriented  Best Motor Response: Obeys commands  [place] Coma Scale Score: 15          PHYSICAL EXAM    (up to 7 for level 4, 8 or more for level 5)     ED Triage Vitals [03/09/21 1549]   BP Temp Temp Source Pulse Resp SpO2 Height Weight   115/74 98.8 °F (37.1 °C) Oral 89 16 100 % 5' 8\" (1.727 m) 200 lb (90.7 kg)       Physical Exam  Vitals signs and nursing note reviewed. Constitutional:       General: She is not in acute distress. Appearance: She is well-developed. She is not diaphoretic. HENT:      Head: Normocephalic. Nose: Nose normal.   Eyes:      Conjunctiva/sclera: Conjunctivae normal.      Pupils: Pupils are equal, round, and reactive to light. Neck:      Musculoskeletal: Normal range of motion and neck supple. Thyroid: No thyromegaly. Cardiovascular:      Rate and Rhythm: Normal rate and regular rhythm. Heart sounds: Normal heart sounds. No murmur. No friction rub. No gallop. Pulmonary:      Effort: Pulmonary effort is normal. No respiratory distress. Breath sounds: Normal breath sounds. Abdominal:      General: Bowel sounds are normal. There is no distension. Palpations: Abdomen is soft. There is no mass. Tenderness:  There is no abdominal tenderness. There is no guarding or rebound. Hernia: No hernia is present. Neurological:      Mental Status: She is alert and oriented to person, place, and time. GCS: GCS eye subscore is 4. GCS verbal subscore is 5. GCS motor subscore is 6. Cranial Nerves: No cranial nerve deficit. Sensory: No sensory deficit. Motor: No abnormal muscle tone.       Coordination: Coordination normal.      Deep Tendon Reflexes: Reflexes normal.   Psychiatric:         Behavior: Behavior normal.         DIAGNOSTIC RESULTS     EKG: All EKG's are interpreted by the Emergency Department Physician who either signs or Co-signs this chart in the absence of a cardiologist.        RADIOLOGY:   Non-plain film images such as CT, Ultrasound and MRI are read by the radiologist. Xiomara [place] radiographic images are visualized and preliminarily interpreted by the emergency physician with the below findings:        Interpretation per the Radiologist below, if available at the time of this note:    No orders to display           LABS:  Results for orders placed or performed during the hospital encounter of 03/09/21   Urinalysis   Result Value Ref Range    Color, UA Yellow Straw/Yellow    Clarity, UA SL CLOUDY (A) Clear    Glucose, Ur Negative Negative mg/dL    Bilirubin Urine Negative Negative    Ketones, Urine Negative Negative mg/dL    Specific Gravity, UA >=1.030 1.005 - 1.030    Blood, Urine TRACE-INTACT (A) Negative    pH, UA 5.0 5.0 - 8.0    Protein, UA Negative Negative mg/dL    Urobilinogen, Urine 1.0 <2.0 E.U./dL    Nitrite, Urine Negative Negative    Leukocyte Esterase, Urine Negative Negative    Microscopic Examination YES     Urine Type NotGiven    Pregnancy, Urine   Result Value Ref Range    HCG(Urine) Pregnancy Test Negative Detects HCG level >20 MIU/mL   Microscopic Urinalysis   Result Value Ref Range    Mucus, UA 1+ (A) None Seen /LPF    WBC, UA 3-5 0 - 5 /HPF    RBC, UA 5-10 (A) 0 - 4 /HPF    Epithelial Cells, UA

## 2022-07-07 ENCOUNTER — APPOINTMENT (OUTPATIENT)
Dept: CT IMAGING | Age: 42
End: 2022-07-07
Payer: COMMERCIAL

## 2022-07-07 ENCOUNTER — APPOINTMENT (OUTPATIENT)
Dept: GENERAL RADIOLOGY | Age: 42
End: 2022-07-07
Payer: COMMERCIAL

## 2022-07-07 ENCOUNTER — HOSPITAL ENCOUNTER (EMERGENCY)
Age: 42
Discharge: ANOTHER HOSPITAL | End: 2022-07-08
Attending: EMERGENCY MEDICINE
Payer: COMMERCIAL

## 2022-07-07 DIAGNOSIS — R53.1 RIGHT SIDED WEAKNESS: ICD-10-CM

## 2022-07-07 DIAGNOSIS — R47.81 SLURRED SPEECH: Primary | ICD-10-CM

## 2022-07-07 LAB
A/G RATIO: 1.9 (ref 1.1–2.2)
ALBUMIN SERPL-MCNC: 4.1 G/DL (ref 3.4–5)
ALP BLD-CCNC: 66 U/L (ref 40–129)
ALT SERPL-CCNC: 13 U/L (ref 10–40)
ANION GAP SERPL CALCULATED.3IONS-SCNC: 14 MMOL/L (ref 3–16)
AST SERPL-CCNC: 14 U/L (ref 15–37)
BASOPHILS ABSOLUTE: 0 K/UL (ref 0–0.2)
BASOPHILS RELATIVE PERCENT: 0.5 %
BILIRUB SERPL-MCNC: <0.2 MG/DL (ref 0–1)
BUN BLDV-MCNC: 4 MG/DL (ref 7–20)
CALCIUM SERPL-MCNC: 8.9 MG/DL (ref 8.3–10.6)
CHLORIDE BLD-SCNC: 101 MMOL/L (ref 99–110)
CO2: 21 MMOL/L (ref 21–32)
CREAT SERPL-MCNC: 1 MG/DL (ref 0.6–1.1)
EOSINOPHILS ABSOLUTE: 0 K/UL (ref 0–0.6)
EOSINOPHILS RELATIVE PERCENT: 0.2 %
GFR AFRICAN AMERICAN: >60
GFR NON-AFRICAN AMERICAN: >60
GLUCOSE BLD-MCNC: 91 MG/DL (ref 70–99)
HCT VFR BLD CALC: 41.7 % (ref 36–48)
HEMOGLOBIN: 14.2 G/DL (ref 12–16)
INR BLD: 1.04 (ref 0.87–1.14)
LYMPHOCYTES ABSOLUTE: 0.2 K/UL (ref 1–5.1)
LYMPHOCYTES RELATIVE PERCENT: 3.2 %
MAGNESIUM: 1.8 MG/DL (ref 1.8–2.4)
MCH RBC QN AUTO: 31.4 PG (ref 26–34)
MCHC RBC AUTO-ENTMCNC: 34 G/DL (ref 31–36)
MCV RBC AUTO: 92.2 FL (ref 80–100)
MONOCYTES ABSOLUTE: 0.5 K/UL (ref 0–1.3)
MONOCYTES RELATIVE PERCENT: 6.6 %
NEUTROPHILS ABSOLUTE: 6.6 K/UL (ref 1.7–7.7)
NEUTROPHILS RELATIVE PERCENT: 89.5 %
PDW BLD-RTO: 14.1 % (ref 12.4–15.4)
PLATELET # BLD: 166 K/UL (ref 135–450)
PMV BLD AUTO: 9.2 FL (ref 5–10.5)
POTASSIUM REFLEX MAGNESIUM: 3.5 MMOL/L (ref 3.5–5.1)
PROTHROMBIN TIME: 13.4 SEC (ref 11.7–14.5)
RBC # BLD: 4.52 M/UL (ref 4–5.2)
SODIUM BLD-SCNC: 136 MMOL/L (ref 136–145)
TOTAL PROTEIN: 6.3 G/DL (ref 6.4–8.2)
TROPONIN: <0.01 NG/ML
WBC # BLD: 7.4 K/UL (ref 4–11)

## 2022-07-07 PROCEDURE — 70498 CT ANGIOGRAPHY NECK: CPT

## 2022-07-07 PROCEDURE — 85025 COMPLETE CBC W/AUTO DIFF WBC: CPT

## 2022-07-07 PROCEDURE — 36415 COLL VENOUS BLD VENIPUNCTURE: CPT

## 2022-07-07 PROCEDURE — 93005 ELECTROCARDIOGRAM TRACING: CPT | Performed by: EMERGENCY MEDICINE

## 2022-07-07 PROCEDURE — 99285 EMERGENCY DEPT VISIT HI MDM: CPT

## 2022-07-07 PROCEDURE — 84484 ASSAY OF TROPONIN QUANT: CPT

## 2022-07-07 PROCEDURE — 71045 X-RAY EXAM CHEST 1 VIEW: CPT

## 2022-07-07 PROCEDURE — 83735 ASSAY OF MAGNESIUM: CPT

## 2022-07-07 PROCEDURE — 6370000000 HC RX 637 (ALT 250 FOR IP): Performed by: EMERGENCY MEDICINE

## 2022-07-07 PROCEDURE — 80053 COMPREHEN METABOLIC PANEL: CPT

## 2022-07-07 PROCEDURE — 70450 CT HEAD/BRAIN W/O DYE: CPT

## 2022-07-07 PROCEDURE — 85610 PROTHROMBIN TIME: CPT

## 2022-07-07 PROCEDURE — 6360000004 HC RX CONTRAST MEDICATION: Performed by: EMERGENCY MEDICINE

## 2022-07-07 RX ORDER — GABAPENTIN 300 MG/1
300 CAPSULE ORAL ONCE
Status: COMPLETED | OUTPATIENT
Start: 2022-07-07 | End: 2022-07-07

## 2022-07-07 RX ORDER — DONEPEZIL HYDROCHLORIDE 5 MG/1
5 TABLET, FILM COATED ORAL NIGHTLY
Status: DISCONTINUED | OUTPATIENT
Start: 2022-07-07 | End: 2022-07-08 | Stop reason: HOSPADM

## 2022-07-07 RX ADMIN — IOPAMIDOL 85 ML: 755 INJECTION, SOLUTION INTRAVENOUS at 18:14

## 2022-07-07 RX ADMIN — DONEPEZIL HYDROCHLORIDE 5 MG: 5 TABLET, FILM COATED ORAL at 21:47

## 2022-07-07 RX ADMIN — GABAPENTIN 300 MG: 300 CAPSULE ORAL at 21:47

## 2022-07-07 ASSESSMENT — PAIN - FUNCTIONAL ASSESSMENT: PAIN_FUNCTIONAL_ASSESSMENT: NONE - DENIES PAIN

## 2022-07-07 NOTE — ED NOTES
1927 consult completed with call back From Dr. Dandre Young spoke with Dr. Romy Chun  07/07/22 1936 2047 [address] [place] called back with Bed assignment and report #   Bed# 512 report # [phone]    Transport is [facility] with an ETA of [address]  07/07/22 2054

## 2022-07-07 NOTE — ED NOTES
[id number].      Lacy Matthews  07/07/22 1428

## 2022-07-07 NOTE — ED NOTES
1758 Call placed to [facility] Stroke Team      Jarrod Tyler  07/07/22 1800    1801 Dr. Valentina Romeo back spoke with Dr. Christy Saab  07/07/22 1805    [facility] Called spoke With Dr. Christy Saab  07/07/22 1812

## 2022-07-08 ENCOUNTER — HOSPITAL ENCOUNTER (OUTPATIENT)
Age: 42
Setting detail: OBSERVATION
Discharge: HOME | End: 2022-07-09
Attending: INTERNAL MEDICINE | Admitting: INTERNAL MEDICINE
Payer: COMMERCIAL

## 2022-07-08 VITALS
OXYGEN SATURATION: 96 % | SYSTOLIC BLOOD PRESSURE: 122 MMHG | DIASTOLIC BLOOD PRESSURE: 74 MMHG | RESPIRATION RATE: 28 BRPM | HEART RATE: 83 BPM | WEIGHT: 180 LBS | BODY MASS INDEX: 26.66 KG/M2 | HEIGHT: 69 IN | TEMPERATURE: 100.2 F

## 2022-07-08 PROBLEM — U07.1 COVID-19 VIREMIA: Status: ACTIVE | Noted: 2022-07-08

## 2022-07-08 PROBLEM — R29.90 STROKE-LIKE SYMPTOMS: Status: ACTIVE | Noted: 2022-07-08

## 2022-07-08 LAB
A/G RATIO: 1.8 (ref 1.1–2.2)
ALBUMIN SERPL-MCNC: 3.8 G/DL (ref 3.4–5)
ALP BLD-CCNC: 58 U/L (ref 40–129)
ALT SERPL-CCNC: 10 U/L (ref 10–40)
ANION GAP SERPL CALCULATED.3IONS-SCNC: 13 MMOL/L (ref 3–16)
AST SERPL-CCNC: 13 U/L (ref 15–37)
BASOPHILS ABSOLUTE: 0 K/UL (ref 0–0.2)
BASOPHILS RELATIVE PERCENT: 0.2 %
BILIRUB SERPL-MCNC: <0.2 MG/DL (ref 0–1)
BUN BLDV-MCNC: 6 MG/DL (ref 7–20)
CALCIUM SERPL-MCNC: 8.9 MG/DL (ref 8.3–10.6)
CHLORIDE BLD-SCNC: 103 MMOL/L (ref 99–110)
CHOLESTEROL, TOTAL: 134 MG/DL (ref 0–199)
CO2: 22 MMOL/L (ref 21–32)
CREAT SERPL-MCNC: 1 MG/DL (ref 0.6–1.1)
EKG ATRIAL RATE: 101 BPM
EKG DIAGNOSIS: NORMAL
EKG P AXIS: 46 DEGREES
EKG P-R INTERVAL: 148 MS
EKG Q-T INTERVAL: 310 MS
EKG QRS DURATION: 90 MS
EKG QTC CALCULATION (BAZETT): 401 MS
EKG R AXIS: 34 DEGREES
EKG T AXIS: 50 DEGREES
EKG VENTRICULAR RATE: 101 BPM
EOSINOPHILS ABSOLUTE: 0 K/UL (ref 0–0.6)
EOSINOPHILS RELATIVE PERCENT: 0.1 %
ESTIMATED AVERAGE GLUCOSE: 114 MG/DL
GFR AFRICAN AMERICAN: >60
GFR NON-AFRICAN AMERICAN: >60
GLUCOSE BLD-MCNC: 121 MG/DL (ref 70–99)
HBA1C MFR BLD: 5.6 %
HCT VFR BLD CALC: 42.8 % (ref 36–48)
HDLC SERPL-MCNC: 29 MG/DL (ref 40–60)
HEMOGLOBIN: 14.6 G/DL (ref 12–16)
INFLUENZA A: NOT DETECTED
INFLUENZA B: NOT DETECTED
LDL CHOLESTEROL CALCULATED: 92 MG/DL
LYMPHOCYTES ABSOLUTE: 0.4 K/UL (ref 1–5.1)
LYMPHOCYTES RELATIVE PERCENT: 5.9 %
MAGNESIUM: 2 MG/DL (ref 1.8–2.4)
MCH RBC QN AUTO: 31.6 PG (ref 26–34)
MCHC RBC AUTO-ENTMCNC: 34 G/DL (ref 31–36)
MCV RBC AUTO: 93 FL (ref 80–100)
MONOCYTES ABSOLUTE: 0.6 K/UL (ref 0–1.3)
MONOCYTES RELATIVE PERCENT: 10 %
NEUTROPHILS ABSOLUTE: 5.3 K/UL (ref 1.7–7.7)
NEUTROPHILS RELATIVE PERCENT: 83.8 %
PDW BLD-RTO: 14.1 % (ref 12.4–15.4)
PLATELET # BLD: 164 K/UL (ref 135–450)
PMV BLD AUTO: 9.2 FL (ref 5–10.5)
POTASSIUM REFLEX MAGNESIUM: 3.2 MMOL/L (ref 3.5–5.1)
RBC # BLD: 4.61 M/UL (ref 4–5.2)
SARS-COV-2 RNA, RT PCR: DETECTED
SODIUM BLD-SCNC: 138 MMOL/L (ref 136–145)
TOTAL PROTEIN: 5.9 G/DL (ref 6.4–8.2)
TRIGL SERPL-MCNC: 63 MG/DL (ref 0–150)
VLDLC SERPL CALC-MCNC: 13 MG/DL
WBC # BLD: 6.3 K/UL (ref 4–11)

## 2022-07-08 PROCEDURE — 97535 SELF CARE MNGMENT TRAINING: CPT

## 2022-07-08 PROCEDURE — 83735 ASSAY OF MAGNESIUM: CPT

## 2022-07-08 PROCEDURE — 99205 OFFICE O/P NEW HI 60 MIN: CPT | Performed by: NURSE PRACTITIONER

## 2022-07-08 PROCEDURE — 80061 LIPID PANEL: CPT

## 2022-07-08 PROCEDURE — 80053 COMPREHEN METABOLIC PANEL: CPT

## 2022-07-08 PROCEDURE — 36415 COLL VENOUS BLD VENIPUNCTURE: CPT

## 2022-07-08 PROCEDURE — 97110 THERAPEUTIC EXERCISES: CPT

## 2022-07-08 PROCEDURE — 97165 OT EVAL LOW COMPLEX 30 MIN: CPT

## 2022-07-08 PROCEDURE — 97116 GAIT TRAINING THERAPY: CPT

## 2022-07-08 PROCEDURE — 6370000000 HC RX 637 (ALT 250 FOR IP): Performed by: NURSE PRACTITIONER

## 2022-07-08 PROCEDURE — 92526 ORAL FUNCTION THERAPY: CPT

## 2022-07-08 PROCEDURE — G0379 DIRECT REFER HOSPITAL OBSERV: HCPCS

## 2022-07-08 PROCEDURE — 92610 EVALUATE SWALLOWING FUNCTION: CPT

## 2022-07-08 PROCEDURE — 97161 PT EVAL LOW COMPLEX 20 MIN: CPT

## 2022-07-08 PROCEDURE — G0378 HOSPITAL OBSERVATION PER HR: HCPCS

## 2022-07-08 PROCEDURE — 83036 HEMOGLOBIN GLYCOSYLATED A1C: CPT

## 2022-07-08 PROCEDURE — 85025 COMPLETE CBC W/AUTO DIFF WBC: CPT

## 2022-07-08 PROCEDURE — 6360000002 HC RX W HCPCS: Performed by: INTERNAL MEDICINE

## 2022-07-08 PROCEDURE — 6370000000 HC RX 637 (ALT 250 FOR IP): Performed by: INTERNAL MEDICINE

## 2022-07-08 PROCEDURE — 6370000000 HC RX 637 (ALT 250 FOR IP): Performed by: EMERGENCY MEDICINE

## 2022-07-08 PROCEDURE — 96372 THER/PROPH/DIAG INJ SC/IM: CPT

## 2022-07-08 PROCEDURE — 87636 SARSCOV2 & INF A&B AMP PRB: CPT

## 2022-07-08 RX ORDER — OXYCODONE AND ACETAMINOPHEN 10; 325 MG/1; MG/1
1 TABLET ORAL EVERY 6 HOURS PRN
Status: DISCONTINUED | OUTPATIENT
Start: 2022-07-08 | End: 2022-07-09 | Stop reason: HOSPADM

## 2022-07-08 RX ORDER — LISINOPRIL 20 MG/1
20 TABLET ORAL DAILY
Status: DISCONTINUED | OUTPATIENT
Start: 2022-07-08 | End: 2022-07-09 | Stop reason: HOSPADM

## 2022-07-08 RX ORDER — ATORVASTATIN CALCIUM 80 MG/1
80 TABLET, FILM COATED ORAL DAILY
Status: DISCONTINUED | OUTPATIENT
Start: 2022-07-08 | End: 2022-07-09 | Stop reason: HOSPADM

## 2022-07-08 RX ORDER — ENOXAPARIN SODIUM 100 MG/ML
30 INJECTION SUBCUTANEOUS 2 TIMES DAILY
Status: DISCONTINUED | OUTPATIENT
Start: 2022-07-08 | End: 2022-07-09 | Stop reason: HOSPADM

## 2022-07-08 RX ORDER — ONDANSETRON 4 MG/1
4 TABLET, ORALLY DISINTEGRATING ORAL EVERY 8 HOURS PRN
Status: DISCONTINUED | OUTPATIENT
Start: 2022-07-08 | End: 2022-07-09 | Stop reason: HOSPADM

## 2022-07-08 RX ORDER — DONEPEZIL HYDROCHLORIDE 5 MG/1
5 TABLET, FILM COATED ORAL NIGHTLY
Status: DISCONTINUED | OUTPATIENT
Start: 2022-07-08 | End: 2022-07-09 | Stop reason: HOSPADM

## 2022-07-08 RX ORDER — GABAPENTIN 300 MG/1
300 CAPSULE ORAL 3 TIMES DAILY
Status: DISCONTINUED | OUTPATIENT
Start: 2022-07-08 | End: 2022-07-09 | Stop reason: HOSPADM

## 2022-07-08 RX ORDER — ONDANSETRON 2 MG/ML
4 INJECTION INTRAMUSCULAR; INTRAVENOUS EVERY 6 HOURS PRN
Status: DISCONTINUED | OUTPATIENT
Start: 2022-07-08 | End: 2022-07-09 | Stop reason: HOSPADM

## 2022-07-08 RX ORDER — POLYETHYLENE GLYCOL 3350 17 G/17G
17 POWDER, FOR SOLUTION ORAL DAILY PRN
Status: DISCONTINUED | OUTPATIENT
Start: 2022-07-08 | End: 2022-07-09 | Stop reason: HOSPADM

## 2022-07-08 RX ORDER — MECOBALAMIN 5000 MCG
10 TABLET,DISINTEGRATING ORAL NIGHTLY
Status: DISCONTINUED | OUTPATIENT
Start: 2022-07-08 | End: 2022-07-09 | Stop reason: HOSPADM

## 2022-07-08 RX ORDER — ACETAMINOPHEN 325 MG/1
650 TABLET ORAL EVERY 4 HOURS PRN
Status: DISCONTINUED | OUTPATIENT
Start: 2022-07-08 | End: 2022-07-09 | Stop reason: HOSPADM

## 2022-07-08 RX ORDER — ACETAMINOPHEN 500 MG
1000 TABLET ORAL ONCE
Status: COMPLETED | OUTPATIENT
Start: 2022-07-08 | End: 2022-07-08

## 2022-07-08 RX ADMIN — OXYCODONE AND ACETAMINOPHEN 1 TABLET: 10; 325 TABLET ORAL at 15:45

## 2022-07-08 RX ADMIN — ENOXAPARIN SODIUM 30 MG: 100 INJECTION SUBCUTANEOUS at 21:08

## 2022-07-08 RX ADMIN — Medication 10 MG: at 23:44

## 2022-07-08 RX ADMIN — GABAPENTIN 300 MG: 300 CAPSULE ORAL at 15:09

## 2022-07-08 RX ADMIN — ASPIRIN 325 MG: 325 TABLET, COATED ORAL at 09:27

## 2022-07-08 RX ADMIN — DONEPEZIL HYDROCHLORIDE 5 MG: 5 TABLET, FILM COATED ORAL at 21:08

## 2022-07-08 RX ADMIN — LISINOPRIL 20 MG: 20 TABLET ORAL at 09:27

## 2022-07-08 RX ADMIN — OXYCODONE AND ACETAMINOPHEN 1 TABLET: 10; 325 TABLET ORAL at 21:09

## 2022-07-08 RX ADMIN — ATORVASTATIN CALCIUM 80 MG: 80 TABLET, FILM COATED ORAL at 09:28

## 2022-07-08 RX ADMIN — GABAPENTIN 300 MG: 300 CAPSULE ORAL at 21:08

## 2022-07-08 RX ADMIN — GABAPENTIN 300 MG: 300 CAPSULE ORAL at 09:28

## 2022-07-08 RX ADMIN — ACETAMINOPHEN 1000 MG: 500 TABLET ORAL at 01:16

## 2022-07-08 RX ADMIN — OXYCODONE AND ACETAMINOPHEN 1 TABLET: 10; 325 TABLET ORAL at 09:33

## 2022-07-08 RX ADMIN — ENOXAPARIN SODIUM 30 MG: 100 INJECTION SUBCUTANEOUS at 09:28

## 2022-07-08 ASSESSMENT — PAIN DESCRIPTION - DESCRIPTORS: DESCRIPTORS: ACHING;SORE

## 2022-07-08 ASSESSMENT — PAIN SCALES - GENERAL
PAINLEVEL_OUTOF10: 6
PAINLEVEL_OUTOF10: 9

## 2022-07-08 ASSESSMENT — PAIN DESCRIPTION - ORIENTATION: ORIENTATION: RIGHT

## 2022-07-08 ASSESSMENT — PAIN DESCRIPTION - LOCATION: LOCATION: BACK;ARM

## 2022-07-08 ASSESSMENT — PAIN - FUNCTIONAL ASSESSMENT: PAIN_FUNCTIONAL_ASSESSMENT: ACTIVITIES ARE NOT PREVENTED

## 2022-07-08 NOTE — ED PROVIDER NOTES
[facility] Emergency Department      CHIEF COMPLAINT  Cerebrovascular Accident (Patient began having difficulty speaking and vision changes at 10am this date. had right sided weakness that has subsided, history of prior CVA)      HISTORY OF PRESENT ILLNESS  Emi Ramirez is a 40 y.o. female with a history of CVA in 2018 presents with worsening slurred speech and right-sided weakness. She states she felt she was at baseline at 10 PM last night when she went to bed. She got up around 10 AM today when she went to get out of bed she felt like her right side was weaker than usual.  She also noticed that her speech seemed slurred. She had an episode today where her vision was blurry as well but this is resolved. She stayed in bed and slept most of the day and then her neighbor checked on her this afternoon and noticed her symptoms. She takes full dose aspirin and has been compliant with this. She had a large MCA stroke in 2018 and required craniotomy at the time as well. She does not have a history of seizures and is not on seizure medication. .   No other complaints, modifying factors or associated symptoms. I have reviewed the following from the nursing documentation.     Past Medical History:   Diagnosis Date    Cerebral artery occlusion with cerebral infarction ([address]     Cerebral infarction ([address]     Chronic back pain     Depression     Hyperlipidemia     Hypertension      Past Surgical History:   Procedure Laterality Date     SECTION      TONSILLECTOMY       Family History   Problem Relation Age of Onset    Substance Abuse Father     Alcohol Abuse Father     Substance Abuse Brother     Cancer Maternal Aunt     Diabetes Maternal Grandmother     Heart Disease Maternal Grandmother     Stroke Maternal Grandmother      Social History     Socioeconomic History    Marital status: Single     Spouse name: Not on file    Number of children: Not on file    Years of education: Not on file    Highest education level: Not on file   Occupational History    Not on file   Tobacco Use    Smoking status: Current Every Day Smoker     Packs/day: 1.00     Types: Cigarettes    Smokeless tobacco: Never Used   Substance and Sexual Activity    Alcohol use: No    Drug use: No    Sexual activity: Yes     Partners: Male   Other Topics Concern    Not on file   Social History Narrative    Not on file     Social Determinants of Health     Financial Resource Strain:     Difficulty of Paying Living Expenses: Not on file   Food Insecurity:     Worried About Running Out of Food in the Last Year: Not on file    [address] [facility] [address] of Food in the Last Year: Not on file   Transportation Needs:     Lack of Transportation (Medical): Not on file    Lack of Transportation (Non-Medical):  Not on file   Physical Activity:     Days of Exercise per Week: Not on file    Minutes of Exercise per Session: Not on file   Stress:     Feeling of Stress : Not on file   Social Connections:     Frequency of Communication with Friends and Family: Not on file    Frequency of Social Gatherings with Friends and Family: Not on file    Attends [employer] Services: Not on file    Active Member of [address] [place] [address] or Organizations: Not on file    Attends Club or Organization Meetings: Not on file    Marital Status: Not on file   Intimate Partner Violence:     Fear of Current or Ex-Partner: Not on file    Emotionally Abused: Not on file    Physically Abused: Not on file    Sexually Abused: Not on file   Housing Stability:     Unable to Pay for Housing in the Last Year: Not on file    Number of [place] in the Last Year: Not on file    Unstable Housing in the Last Year: Not on file     Current Facility-Administered Medications   Medication Dose Route Frequency Provider Last Rate Last Admin    donepezil (ARICEPT) tablet 5 mg  5 mg Oral Nightly Daniel Hobson MD   5 mg at 07/07/22 6100     Current Outpatient Medications   Medication Sig Dispense Refill    oxyCODONE-acetaminophen (PERCOCET)  MG per tablet Take 1 tablet by mouth every 6 hours as needed. Junior Cervantes aspirin 325 MG EC tablet Take 1 tablet by mouth daily 30 tablet 3    cyanocobalamin 1000 MCG tablet Take 1,000 mcg by mouth      gabapentin (NEURONTIN) 300 MG capsule Take 300 mg by mouth 3 times daily. Junior Cos lisinopril (PRINIVIL;ZESTRIL) 20 MG tablet Take 20 mg by mouth daily      atorvastatin (LIPITOR) 80 MG tablet Take 80 mg by mouth daily      donepezil (ARICEPT) 5 MG tablet Take 5 mg by mouth nightly      polyethylene glycol (GLYCOLAX) packet Take 17 g by mouth daily as needed for Constipation       Allergies   Allergen Reactions    Aminoglycosides     Ultram [Tramadol] Itching       REVIEW OF SYSTEMS      General:  No fevers  Eyes: Episode of blurred vision, resolved. ENT:  No sore throat, no nasal congestion  Cardiovascular:  no palpitations  Respiratory:   no cough, no wheezing  Gastrointestinal:  No abdominal pain, no vomiting, no diarrhea  Musculoskeletal:  No muscle pain, no joint pain  Skin:  No rash   Neurologic: Worsened right-sided weakness and slurred speech. Genitourinary:  No dysuria  Extremities:  no edema, no pain      Unless otherwise stated in this report, this patient's positive and negative responses for review of systems (constitutional, eyes, ENT, cardiovascular, respiratory, gastrointestinal, neurological, genitourinary, musculoskeletal, integument systems and systems related to the presenting problem) are either stated in the preceding paragraph, were not pertinent or were negative for the symptoms and/or complaints related to the medical problem. PHYSICAL EXAM  /73   Pulse 97   Resp (!) 32   Ht 5' 9\" (1.753 m)   Wt 180 lb (81.6 kg)   SpO2 93%   BMI 26.58 kg/m²   GENERAL APPEARANCE: Awake and alert. Cooperative. No acute distress. HEAD: Normocephalic. Atraumatic. Defect in the left skull from prior craniotomy.   EYES: PERRL. EOM's grossly intact. ENT: Mucous membranes are moist.   NECK: Supple, trachea midline. HEART: RRR. LUNGS: Respirations unlabored. CTAB. Good air exchange. No wheezes, rales, or rhonchi. Speaking comfortably in full sentences. ABDOMEN: Soft. Non-distended. Non-tender. No guarding or rebound. EXTREMITIES: No peripheral edema. MAEE. No acute deformities. SKIN: Warm, dry and intact. No acute rashes. NEUROLOGICAL: Alert and oriented X 3. NIH stroke scale of 3. Mild right-sided facial droop, slurred speech and expressive aphasia. Motor and sensory exam appear intact. PSYCHIATRIC: Normal mood and affect. LABS  I have reviewed all labs for this visit.    Results for orders placed or performed during the hospital encounter of 07/07/22   CBC with Auto Differential   Result Value Ref Range    WBC 7.4 4.0 - 11.0 K/uL    RBC 4.52 4.00 - 5.20 M/uL    Hemoglobin 14.2 12.0 - 16.0 g/dL    Hematocrit 41.7 36.0 - 48.0 %    MCV 92.2 80.0 - 100.0 fL    MCH 31.4 26.0 - 34.0 pg    MCHC 34.0 31.0 - 36.0 g/dL    RDW 14.1 12.4 - 15.4 %    Platelets 706 649 - 205 K/uL    MPV 9.2 5.0 - 10.5 fL    Neutrophils % 89.5 %    Lymphocytes % 3.2 %    Monocytes % 6.6 %    Eosinophils % 0.2 %    Basophils % 0.5 %    Neutrophils Absolute 6.6 1.7 - 7.7 K/uL    Lymphocytes Absolute 0.2 (L) 1.0 - 5.1 K/uL    Monocytes Absolute 0.5 0.0 - 1.3 K/uL    Eosinophils Absolute 0.0 0.0 - 0.6 K/uL    Basophils Absolute 0.0 0.0 - 0.2 K/uL   Comprehensive Metabolic Panel w/ Reflex to MG   Result Value Ref Range    Sodium 136 136 - 145 mmol/L    Potassium reflex Magnesium 3.5 3.5 - 5.1 mmol/L    Chloride 101 99 - 110 mmol/L    CO2 21 21 - 32 mmol/L    Anion Gap 14 3 - 16    Glucose 91 70 - 99 mg/dL    BUN 4 (L) 7 - 20 mg/dL    CREATININE 1.0 0.6 - 1.1 mg/dL    GFR Non-African American >60 >60    GFR African American >60 >60    Calcium 8.9 8.3 - 10.6 mg/dL    Total Protein 6.3 (L) 6.4 - 8.2 g/dL    Albumin 4.1 3.4 - 5.0 g/dL    Albumin/Globulin Ratio 1.9 1.1 - 2.2    Total Bilirubin <0.2 0.0 - 1.0 mg/dL    Alkaline Phosphatase 66 40 - 129 U/L    ALT 13 10 - 40 U/L    AST 14 (L) 15 - 37 U/L   Troponin   Result Value Ref Range    Troponin <0.01 <0.01 ng/mL   Protime-INR   Result Value Ref Range    Protime 13.4 11.7 - 14.5 sec    INR 1.04 0.87 - 1.14   Magnesium   Result Value Ref Range    Magnesium 1.80 1.80 - 2.40 mg/dL   EKG 12 Lead   Result Value Ref Range    Ventricular Rate 101 BPM    Atrial Rate 101 BPM    P-R Interval 148 ms    QRS Duration 90 ms    Q-T Interval 310 ms    QTc Calculation (Bazett) 401 ms    P Axis 46 degrees    R Axis 34 degrees    T Axis 50 degrees    Diagnosis       Sinus tachycardiaLow voltage QRSBorderline ECGWhen compared with ECG of 28-JUL-2020 13:38,No significant change was found       EKG  The Ekg interpreted by myself  sinus tachycardia, cijs=130 with a rate of 101  Axis is   Normal  QTc is  normal  Intervals and Durations are unremarkable. No specific ST-T wave changes appreciated. No evidence of acute ischemia. Sinus tachycardia has replaced normal sinus rhythm when compared to the EKG from July 28, 2020    Cardiac Monitoring: The cardiac monitor revealed normal sinus rhythm as interpreted by me. The cardiac monitor was ordered secondary to the patient's complaint of strokelike symptoms and to monitor the patient for dysrhythmia. RADIOLOGY  X-RAYS: ALL IMAGES INCLUDING PLAIN FILMS, CT, ULTRASOUND AND MRI HAVE BEEN READ BY THE RADIOLOGIST. I have personally reviewed plain film images and have reviewed the radiology reports. XR CHEST PORTABLE   Final Result   Stable chest with no acute abnormality seen. CTA HEAD NECK W CONTRAST   Final Result   No large vessel occlusion visualized within the head or neck. Attenuated flow within the left MCA and branch vessels, likely due to   presence of a remote infarct. Incidentally noted pulmonary emphysema.       RECOMMENDATIONS:   Unavailable         CT HEAD WO CONTRAST   Final Result   No CT evidence of an acute infarct. The findings were sent to the Radiology Results [address] at 6:10   pm on 7/7/2022 to be communicated to a licensed caregiver. RECOMMENDATIONS:   Unavailable                    Rechecks: Physical assessment performed. Patient symptoms have not worsened here. She is starting to feel slightly better she states. She has an update on her CT and lab work findings. She is requesting that we admit her to . We did call  and they do not have any neuro beds available. She is agreeable to transfer to [address] [place] [address] Care:I personally spent a total of 35 minutes of critical care time in obtaining history, performing a physical exam, bedside monitoring of interventions, collecting and interpreting tests and discussion with consultants but excluding time spent performing procedures, treating other patients and teaching time. Smoking Cessation counseling: At least 3 minutes of smoking cessation education was provided to the patient including assessing the patient's readiness for change, identifying barriers to change, suggesting specific actions for change, motivational counseling and arranging follow-up. Sepsis:  Is this patient to be included in the SEP-1 Core Measure due to severe sepsis or septic shock? No   Exclusion criteria - the patient is NOT to be included for SEP-1 Core Measure due to:   Infection is not suspected       NIHSS:NIH Stroke Score:  1A: Level of Consciousness  Alert; keenly responsive 0  Arouses to minor stimulation +1  Requires repeated stimulation to arouse +2  Movements to Pain +2  Postures or Unresponsive +3     1B: Ask Month and Age  Both Questions Right 0  1 Question Right +1  0 Questions Right +2  Dysarthric/Intubated/ Trauma/Language Barrier +1  Aphasic +2     1C: 'Blink Eyes' & All +2  No Response and Quadriplegic +2  Coma/Unresponsive +2     9: Test Language/Aphasia (Describe the scene; name the words; read the sentences)  Normal; No aphasia 0  Mild-Moderate Aphasia: Some Obvious Changes, Without Significant Limitation +1  Severe Aphasia: Fragmentary Expression, Inference Needed, Cannot Identify   Materials +2  Mute/Global Aphasia: No Usable Speech/Auditory Comprehension +3  Coma/Unresponsive +3     10: Test Dysarthria (Read the words)  Intubated/Unable to Test 0  Normal 0  Mild-Moderate Dysarthria: Slurring but can be understood +1  Severe Dysarthria: Unintelligble Slurring or Out of Proportion to Dysphasia +2  Mute/Anarthric +2     11: Test Extinction/Inattention  No abnormality 0  Visual/tactile/auditory/spatial/personal inattention +1  Extinction to bilateral simultaneous stimulation +1  Profound lselie-inattention (ex: does not recognize own hand) +2  Extinction to >1 modality +2     NIH score is 3          ED COURSE/MDM  Patient seen and evaluated. Here the patient is afebrile with normal vitals signs. Old records reviewed, including details of her large left MCA infarct in April 2018. Here she has an NIH stroke scale of 3. It is somewhat difficult to discern what is chronic versus what is new. The patient tells me that her speech is more slurred and she feels weaker on her right side than normal.  Stroke alert was activated. Last known well was 10 PM last night so she is outside of the window for tPA. CT of the head shows her chronic infarct but nothing acute. CTA head and neck shows no large vessel occlusion. Lab work all appears to be baseline. We will admit the patient for further stroke work-up. The stroke team suspects this is likely stroke recrudescence. Patient has been accepted by Dr. Samm Magana at [facility]. She does take full dose aspirin and has been compliant with this. She states she already took it today.   Labs and imaging reviewed and results discussed with patient. Patient was reassessed as noted above . Plan of care discussed with patient. Patient in agreement with plan. CLINICAL IMPRESSION  1. Slurred speech    2. Right sided weakness        Blood pressure 130/73, pulse 97, resp. rate (!) 32, height 5' 9\" (1.753 m), weight 180 lb (81.6 kg), SpO2 93 %, not currently breastfeeding. DISPOSITION  Meera Mireles was transferred to [facility] in stable condition.     (Please note this note was completed with a voice recognition program.  Efforts were made to edit the dictations but occasionally words are mis-transcribed.)        Miguel Chavarria MD  07/08/22 4315

## 2022-07-08 NOTE — PROGRESS NOTES
..Patient transferred from [place] [address], call light within reach, bed in lowest position and locked. Skin assessment completed. Nikki Verduzco .Bed in lowest position, wheels locked, 2/4 side rails up, nonskid footwear on. Bed/ chair check alarm in place, call light within reach. Pt instructed to call out when needing assistance. Pt stated understanding. Nurse will continue to monitor. Nikki Verduzco .Pt scoring pain on 0-10 scale. Pain medications given per MAR. Pt instructed to call out when pain level increasing. Call light within reach. Nurse will continue to reassess and monitor.             Stanislav Degroot

## 2022-07-08 NOTE — ED NOTES
Nursing report called to [phone], [facility] her for patient, report given      Oj Wells RN  07/08/22 2447

## 2022-07-08 NOTE — PLAN OF CARE
Bedside swallow evaluation completed this date.     Sánchez Armstrong M.S. [id number] [facility]  Speech-language pathologist  [phone]

## 2022-07-08 NOTE — PROGRESS NOTES
Physical Therapy  Facility/Department: [facility] C5 - MED SURG/ORTHO  Physical Therapy Initial Assessment/ Discharge    Name: Marie Arrieta  : 1980  MRN: [id number]  Date of Service: 2022    Discharge Recommendations:  Home with assist PRN   PT Equipment Recommendations  Equipment Needed: No      Patient Diagnosis(es): There were no encounter diagnoses. Past Medical History:  has a past medical history of Cerebral artery occlusion with cerebral infarction [facility]), Cerebral infarction [facility]), Chronic back pain, Depression, Hyperlipidemia, and Hypertension. Past Surgical History:  has a past surgical history that includes Tonsillectomy and  section. Assessment   Body Structures, Functions, Activity Limitations Requiring Skilled Therapeutic Intervention: Decreased functional mobility ; Decreased strength  Assessment: 39 yo female adm with stroke like symptoms, Covid+. PMH prior L CVA with R side weakness. Pt reporting worsened R weakness and slurred speech. Today pt states symptoms resolving and she feels she is at baseline again. She demonstrates indep bed mob transfers and amb without device. She voices understanding of all instructions. No additional PT Planned. Recommend discharge home assist prn  Treatment Diagnosis: R side weakness  Therapy Prognosis: Good  Decision Making: Low Complexity  Barriers to Learning: none  Activity Tolerance  Activity Tolerance: Patient tolerated evaluation without incident;Patient tolerated treatment well     Plan   Plan: Discharge  Plan weeks: 1 x only  Safety Devices  Type of Devices:  All fall risk precautions in place,Bed alarm in place,Call light within reach,Gait belt,Nurse notified,Left in bed     Restrictions  Restrictions/Precautions  Restrictions/Precautions: General Precautions,Isolation,Up as Tolerated (Droplet+)     Subjective   Pain: Denies pain  Chart Reviewed: Yes  Patient assessed for rehabilitation services?: Yes  Family / Caregiver Present: No  Referring Practitioner: tSeffen Doe  Referral Date : 22  Diagnosis: Stroke like symptoms, Covid+  Follows Commands: Within Functional Limits  General Comment  Comments: RN cleared pt for therapy, pt resting in bed on approach  Subjective  Subjective: Pt agrees to therapy, speech is slurred         Social/Functional History  Social/Functional History  Lives With:  (children ages 15 and 15)  Type of Home: House  Home Layout: Two level  Home Access: Stairs to enter with rails  Entrance Stairs - Number of Steps: 2  Bathroom Shower/Tub: Tub/Shower unit  Bathroom Toilet: Standard  ADL Assistance: Independent  Ambulation Assistance: Independent  Transfer Assistance: Independent  Active : Yes  Occupation: Unemployed  Vision/Hearing  Vision  Vision: Impaired  Vision Exceptions: Wears glasses at all times  Hearing  Hearing: Within functional limits    Cognition   Orientation  Overall Orientation Status: Within Normal Limits   Vital signs: /74  HR 83  SpO2 96%  Objective      AROM RLE (degrees)  RLE General AROM: BLEs WFL  Strength RLE  Comment: Hip flexion 4+, abd 4, ext 4, knee ext 5, DF 5, EHL 4+, eversion 4+  Strength LLE  Strength LLE: WNL           Bed mobility  Supine to Sit: Independent  Sit to Supine: Independent  Transfers  Sit to Stand: Independent  Stand to sit: Independent  Ambulation  Surface: level tile  Device: No Device  Quality of Gait: decreased stance phase RLE, mild path deviation, no loss of balance. Pt states she is at baseline (mild LLE weakness from prior CVA).    Distance: 100 ft pacing in room (in isolation precautions)     Balance  Sitting - Static: Good  Sitting - Dynamic: Good  Standing - Static: Good  Standing - Dynamic: Good  Comments: No loss of balance turning in [place]  Exercise Treatment: Ankle pumps: 10 x B  SLR: 5 x B  Hip abd supine: 5 x B glut sets: 5 x B   Isometric clam shells sittin x B  Sit><Stand 5 x in succession          AM-PAC Score  AM-PAC Inpatient

## 2022-07-08 NOTE — PROGRESS NOTES
..4 Eyes Skin Assessment     NAME:  Kaveh Phillips  YOB: 1980  MEDICAL RECORD NUMBER:  [id number]    The patient is being assess for  Admission    I agree that 2 RN's have performed a thorough Head to Toe Skin Assessment on the patient. ALL assessment sites listed below have been assessed. Areas assessed by both nurses:    {Pressure Areas Assessed:13746}        Does the Patient have a Wound?  No noted wound(s)       Chon Prevention initiated:  No   Wound Care Orders initiated:  No    Pressure Injury (Stage 3,4, Unstageable, DTI, NWPT, and Complex wounds) if present place referral/consult order under [de-identified] No    New and Established Ostomies if present place consult order under : No      Nurse 1 eSignature: Electronically signed by Alli Keen on 7/8/22 at 7:50 AM EDT    **SHARE this note so that the co-signing nurse is able to place an eSignature**    Nurse 2 eSignature: {Esignature:[id number]}

## 2022-07-08 NOTE — ACP (ADVANCE CARE PLANNING)
Advance Care Planning     General Advance Care Planning (ACP) Conversation    Date of Conversation: 7/7/2022  Conducted with: Patient with Decision Making Capacity    Healthcare Decision Maker:    Primary Decision Maker: Karson Tsai - [facility] - [phone]  Click here to complete [address] including selection of the Healthcare Decision Maker Relationship (ie \"Primary\"). Today we documented Decision Maker(s) consistent with Legal Next of Kin hierarchy.       Length of Voluntary ACP Conversation in minutes:  <16 minutes (Non-Billable)    FANY Hernandez

## 2022-07-08 NOTE — CARE COORDINATION
CASE MANAGEMENT INITIAL ASSESSMENT      Reviewed chart and completed assessment with patient:yes  Family present: NA  Explained Case Management role/services. yes    Primary contact information:father, Godfrey    Health Care Decision Maker :   Primary Decision Maker: Amanda Yeboah Other - [phone]    Primary Decision Maker: Roni Wells - Parent - [phone]          Can this person be reached and be able to respond quickly, such as within a few minutes or hours? Yes      Admit date/status:7/8/22  Diagnosis:stroke symptoms   Is this a Readmission?:  No      Insurance:[facility]   Precert required for SNF: Yes       3 night stay required: No    Living arrangements, Adls, care needs, prior to admission:home with father, independent in ADLs    [address] at home:  Walker__Cane__RTS__ BSC__Shower Chair__  02__ HHN__ CPAP__  BiPap__  Hospital Bed__ W/C___ Other_____    Services in the home and/or outpatient, prior to [address], NP                                Medications:yes Prescription coverage? Yes Will pt require financial assistance with medications No     Transportation needs: car     Dialysis Facility (if applicable)   · Name:  · Address:  · Dialysis Schedule:  · Phone:  · Fax:    PT/OT recs:    Hospital Exemption Notification (HEN):    Barriers to discharge:medical complications    Plan/comments:Referred to patient for d/c planning. Spoke to patient. Patient is a 40year old female admitted for stroke symptoms. Patient usually lives at home with father. Patient denies d/c needs at this time.   Electronically signed by FANY Teixeira on 7/8/2022 at 4:46 PM      [facility] on chart for MD signature

## 2022-07-08 NOTE — CONSULTS
tablet Oral Q6H PRN Christina Mario MD   1 tablet at 07/08/22 1545    lisinopril (PRINIVIL;ZESTRIL) tablet 20 mg  20 mg Oral Daily Christina Mario MD   20 mg at 07/08/22 6321    gabapentin (NEURONTIN) capsule 300 mg  300 mg Oral TID Christina Mario MD   300 mg at 07/08/22 1509    donepezil (ARICEPT) tablet 5 mg  5 mg Oral Nightly Christina Mario MD        atorvastatin (LIPITOR) tablet 80 mg  80 mg Oral Daily Christina Mario MD   80 mg at 07/08/22 3265    aspirin EC tablet 325 mg  325 mg Oral Daily Christina Mario MD   325 mg at 07/08/22 5347    ondansetron (ZOFRAN-ODT) disintegrating tablet 4 mg  4 mg Oral Q8H PRN Christina Mario MD        Or    ondansetron [facility]) injection 4 mg  4 mg IntraVENous Q6H PRN Christina Mario MD        polyethylene glycol [facility]) packet 17 g  17 g Oral Daily PRN Christina Mario MD        acetaminophen (TYLENOL) tablet 650 mg  650 mg Oral Q4H PRN Christina Mario MD           ROS : A 10-14 system review of constitutional, cardiovascular, respiratory, eyes, musculoskeletal, endocrine, GI, ENT, skin, hematological, genitourinary, psychiatric and neurologic systems was obtained and updated today and is unremarkable except as mentioned in my HPI      Exam:     Constitutional:   Vitals:    07/08/22 0907 07/08/22 0948   BP: 124/76 124/76   Pulse: 75 76   SpO2: 95% 95%       General appearance and observation: Normal development and appear in no acute distress. Eye:  Fundus: No blurring of optic disc. Neck: supple  Cardiovascular: No lower leg edema with good pulsation. Mental Status:   Oriented to person, place, problem, and time. Memory: Good immediate recall. Intact remote memory  Normal attention span and concentration. Language: intact naming. Dysarthria noted. Good fund of Knowledge. Aware of current events and vocabulary   Cranial Nerves:   II: Visual fields: Full. Pupils: equal, round, reactive to light  III,IV,VI: Extra Ocular Movements are intact. panel.  PT/OT/SLP  Droplet precautions for COVID    She may be discharged from a neurological perspective if MRI of brain is negative. Thank you for referring such patient. If you have any questions regarding my consult note, please don't hesitate to call me. Errol Saleh, EFRAIN    This dictation was generated by voice recognition computer software.  Although all attempts are made to edit the dictation for accuracy, there may be errors in the  transcription that are not intended

## 2022-07-08 NOTE — H&P
Hospital Medicine History & Physical      PCP: BEATRIS Smalls CNP    Date of Admission: 2022    Date of Service: Pt seen/examined on 22 and Placed in Observation. Chief Complaint:  Right sided numbness      History Of Present Illness:    40 y.o. female who presented to [facility] with right sided numbness. Patient has prior history of large CVA and craniotomy. She has baseline right sided weakness and speech difficulties. Yesterday, she felt her right leg and arm were becoming tingly and losing sensation. Despite her baseline weakness, she has no chronic sensory loss. She also noted some blurring vision. This lasted for several hours but was resolving by the time she came to the ED. Patient was found to be COVID positive in the ED but has no symptoms of this. Past Medical History:          Diagnosis Date    Cerebral artery occlusion with cerebral infarction ([address]     Cerebral infarction ([address]     Chronic back pain     Depression     Hyperlipidemia     Hypertension        Past Surgical History:          Procedure Laterality Date     SECTION      TONSILLECTOMY         Medications Prior to Admission:      Prior to Admission medications    Medication Sig Start Date End Date Taking? Authorizing Provider   oxyCODONE-acetaminophen (PERCOCET)  MG per tablet Take 1 tablet by mouth every 6 hours as needed. . 19   Historical Provider, MD   aspirin 325 MG EC tablet Take 1 tablet by mouth daily 19   Joey Barnett MD   cyanocobalamin 1000 MCG tablet Take 1,000 mcg by mouth 7/3/18   Historical Provider, MD   gabapentin (NEURONTIN) 300 MG capsule Take 300 mg by mouth 3 times daily. Deangelo Mccall     Historical Provider, MD   lisinopril (PRINIVIL;ZESTRIL) 20 MG tablet Take 20 mg by mouth daily    Historical Provider, MD   atorvastatin (LIPITOR) 80 MG tablet Take 80 mg by mouth daily    Historical Provider, MD   donepezil (ARICEPT) 5 MG tablet Take 5 mg by mouth nightly Historical Provider, MD   polyethylene glycol (GLYCOLAX) packet Take 17 g by mouth daily as needed for Constipation    Historical Provider, MD       Allergies:  Aminoglycosides and Ultram [tramadol]    Social History:      The patient currently lives at home    TOBACCO:   reports that she has been smoking cigarettes. She has been smoking about 1.00 pack per day. She has never used smokeless tobacco.  ETOH:   reports no history of alcohol use. E-cigarette/Vaping     Questions Responses    E-cigarette/Vaping Use     Start Date     Passive Exposure     Quit Date     Counseling Given     Comments             Family History:      Reviewed and negative in regards to presenting illness/complaint. Problem Relation Age of Onset    Substance Abuse Father     Alcohol Abuse Father     Substance Abuse Brother     Cancer Maternal Aunt     Diabetes Maternal Grandmother     Heart Disease Maternal Grandmother     Stroke Maternal Grandmother        REVIEW OF SYSTEMS COMPLETED:   Pertinent positives as noted in the HPI. All other systems reviewed and negative. PHYSICAL EXAM PERFORMED:    /76   Pulse 76   SpO2 95%     General appearance:  No apparent distress, appears stated age and cooperative. HEENT:  Normal cephalic, atraumatic. Left skull defect. Pupils equal, round, and reactive to light. Extra ocular muscles intact. Conjunctivae/corneas clear. Neck: Supple, with full range of motion. No jugular venous distention. Trachea midline. Respiratory:  Normal respiratory effort. Clear to auscultation, bilaterally without Rales/Wheezes/Rhonchi. Cardiovascular:  Regular rate and rhythm with normal S1/S2 without murmurs, rubs or gallops. Abdomen: Soft, non-tender, non-distended with normal bowel sounds. Musculoskeletal:  No clubbing, cyanosis or edema bilaterally. Full range of motion without deformity. Skin: Skin color, texture, turgor normal.  No rashes or lesions.   Neurologic:  Right sided arm and leg weakness with spasticity. No sensory loss. Right facial droop. Aphasia. Psychiatric:  Alert and oriented, thought content appropriate, normal insight  Capillary Refill: Brisk,3 seconds, normal  Peripheral Pulses: +2 palpable, equal bilaterally       Labs:     Recent Labs     07/07/22  1808 07/08/22  0531   WBC 7.4 6.3   HGB 14.2 14.6   HCT 41.7 42.8    164     Recent Labs     07/07/22  1808 07/08/22  0531    138   K 3.5 3.2*    103   CO2 21 22   BUN 4* 6*   CREATININE 1.0 1.0   CALCIUM 8.9 8.9     Recent Labs     07/07/22  1808 07/08/22  0531   AST 14* 13*   ALT 13 10   BILITOT <0.2 <0.2   ALKPHOS 66 58     Recent Labs     07/07/22  1808   INR 1.04     Recent Labs     07/07/22  1808   TROPONINI <0.01       Urinalysis:      Lab Results   Component Value Date/Time    NITRU Negative 03/09/2021 03:35 PM    WBCUA 3-5 03/09/2021 03:35 PM    BACTERIA 1+ 03/09/2021 03:35 PM    RBCUA 5-10 03/09/2021 03:35 PM    BLOODU TRACE-INTACT 03/09/2021 03:35 PM    SPECGRAV >=1.030 03/09/2021 03:35 PM    GLUCOSEU Negative 03/09/2021 03:35 PM       Radiology:     CXR: I have reviewed the CXR with the following interpretation: no acute disease  EKG:  I have reviewed the EKG with the following interpretation: sinus tachycardia    MRI brain without contrast    (Results Pending)       Consults:    IP CONSULT TO NEUROLOGY    ASSESSMENT:    Active Hospital Problems    Diagnosis Date Noted    Stroke-like symptoms [R29.90] 07/08/2022     Priority: Medium    COVID-19 viremia [U07.1] 07/08/2022     Priority: Medium         PLAN:  Right sided weakness, H/O CVA  - increased above baseline.  Has chronic deficits from prior CVA  - possible new CVA vs TIA vs recrudescence  - CT head negative for acute changes  - CTA head and neck negative  - MRI brain pending  - neurology consulted  - continue home ASA, statin    COVID 19  - stable on room air  - asymptomatic  - supportive care    HTN  - well controlled  - continue lisinopril    HLD  - continue statin    DVT Prophylaxis: lovenox  Diet: ADULT DIET; Regular  Code Status: Full Code    PT/OT Eval Status: ordered    Dispo - likely home tomorrow       Chelo Moulton MD    Thank you BEATRIS Dennis CNP for the opportunity to be involved in this patient's care. If you have any questions or concerns please feel free to contact me at [phone].

## 2022-07-08 NOTE — PROGRESS NOTES
Speech Language Pathology  Clinical Bedside Swallow Assessment  Facility/Department: [facility] C5 - MED SURG/ORTHO        Recommendations:  Diet recommendation: IDDSI 7 Regular Solids; IDDSI 0 Thin Liquids; Meds PO as tolerated  Instrumentation: not clinically indicated at this time  Risk management: upright for all intake, stay upright for at least 30 mins after intake, slow rate of intake, general aspiration precautions and hold PO and contact SLP if s/s of aspiration or worsening respiratory status develop. · Pt has baseline apraxia of speech, reports back to baseline. Pt known to this SLP from previous outpatient ST s/p L MCA CVA and L hemicraniectomy. NAME:Delia Yun  : 1980 (40 y.o.)   MRN: [id number]  ROOM: [address] [place] [address]  ADMISSION DATE: 2022  PATIENT DIAGNOSIS(ES): Stroke-like symptoms [R29.90]  No chief complaint on file. Patient Active Problem List    Diagnosis Date Noted    Stroke-like symptoms 2022    COVID-19 viremia 2022    Cerebral vascular accident [facility]) 2018     Past Medical History:   Diagnosis Date    [facility] [address]     Cerebral infarction [facility]     Chronic back pain     Depression     Hyperlipidemia     Hypertension      Past Surgical History:   Procedure Laterality Date     SECTION      TONSILLECTOMY       Allergies   Allergen Reactions    Aminoglycosides     Ultram [Tramadol] Itching       DATE ONSET: Pt currently under observation at [facility]    Date of Evaluation: 2022   Evaluating Therapist: EFREN Lutz    Chart Reviewed: : [x] Yes [] No    Current Diet: ADULT DIET; Regular    Recent Chest Radiography: [x] Chest XR   [] CT of Chest  Date: 22  Impressions  Impression   Stable chest with no acute abnormality seen.        Pain: pt denied    Reason for Referral  Mony Aguiar was referred for a bedside swallow evaluation to assess the efficiency of their swallow understanding. Formal speech/lang/cog evaluation not completed this date d/t pt's return to baseline. Recommendations:  Diet recommendation: IDDSI 7 Regular Solids; IDDSI 0 Thin Liquids; Meds PO as tolerated  Instrumentation: not clinically indicated at this time  Risk management: upright for all intake, stay upright for at least 30 mins after intake, slow rate of intake, general aspiration precautions and hold PO and contact SLP if s/s of aspiration or worsening respiratory status develop.      Prognosis: Good    Recommended Intervention:   [] Dysphagia tx  [] Videostroboscopy                      [] NPO   [] MBS       [] Speech/Cog Eval    [] Therapeutic PO Trials     [] Ice Chips   [] Other:  [] FEES                                                 Dysphagia Therapeutic Intervention:   []  Bolus control Exercises  []  Oral Motor Exercises  []  [employer] Protocol  []  Thermal Stimulation  []  Oral Care    []  Vital Stim/NMES  []  Laryngeal Exercises  []  Patient/Family Education  []  Pharyngeal Exercises  []  Therapeutic PO trials with SLP  []  Diet tolerance monitoring  []  Other:     Referrals:  [] ENT    [] PT  [] Pulmonology [] GI  [] Neurology  [] RD  [] OT   []     Goals:  Short Term Goals:  Timeframe for Short Term Goals: (3 days, 7/11/22)  Goal 1: The patient will tolerate recommended diet with no clinical s/s of aspiration 5/5  Goal 2: The patient will recall/perform recommended compensatory strategies given min cues    Long Term Goals:   Timeframe for Long Term Goals: (5 days, 7/13/22)  Goal 1: The patient will tolerate least restrictive diet with no clinical s/s of aspiration or worsening respiratory/pulmonary status    Treatment:  Skilled instruction completed with patient re: evidenced based practice regarding recommendations and POC, importance of oral care to reduce adverse affects in the event of aspiration, and instruction of recommended compensatory strategies developed based upon clinical exam. Pt able to recall/demonstrate compensatory strategies with no cues. Pt Education: SLP educated the patient re: Role of SLP, rationale for completion of assessment, recommendations and POC  Pt Education Response: verbalized understanding    Duration/Frequency of Tx: 2x/week for LOS    Individuals Consulted:   [x]  Patient     []  NP         [x]  RN   []  RD                   []  MD      []  Family Member                        []  PA    []  Other:      Safety Devices / Report:  [x]  All fall risk precautions in place [x]  Safety handoff completed with RN  [x]  Bed alarm in place  [x]  Left in bed     []  Chair alarm in place  []  Left in chair   [x]  Call light in reach   []  Other:                        Total Treatment Time / Charges       Time in Time out Total Time / units   Swallow Eval/Tx Time  1434 1458 24 min / 2 units     Signature:  Yesenia Moraes M.S. [id number] [facility]  Speech-language pathologist  [id number]

## 2022-07-08 NOTE — PROGRESS NOTES
Occupational Therapy  Facility/Department: [place] [address] - MED SURG/ORTHO  Occupational Therapy Initial Assessment & Discharge    Name: Asael Resendez  : 1980  MRN: [id number]  Date of Service: 2022    Discharge Recommendations: Home w/ PRN assist     Patient Diagnosis(es): There were no encounter diagnoses. Past Medical History:  has a past medical history of Cerebral artery occlusion with cerebral infarction [facility]), Cerebral infarction [facility]), Chronic back pain, Depression, Hyperlipidemia, and Hypertension. Past Surgical History:  has a past surgical history that includes Tonsillectomy and  section. Assessment   Assessment: Pt is a 40 y.o. who presented to [facility] w/ stroke-like syptoms. Pt has h/o L MCA stroke in 2018 and has RUE and RLE weakness at baseline. Pt also COVID+. Pt completed all tasks this date w/ IND and required SBA during functional mobility for safety and balance; reports she has returned to her baseline. Pt does not have any acute OT deficits to be addressed at this time and pt denies any concerns with returning home. Pt will be safe to return home w/ PRN assist.  Prognosis: Good  Decision Making: Low Complexity  No Skilled OT: At baseline function; Independent with ADL's;Independent with functional mobility  REQUIRES OT FOLLOW-UP: No  Activity Tolerance  Activity Tolerance: Patient Tolerated treatment well        Plan   Plan  Times per Week: Discharge     Restrictions  Restrictions/Precautions  Restrictions/Precautions: General Precautions,Isolation,Up as Tolerated    Subjective   General  Chart Reviewed: Yes  Patient assessed for rehabilitation services?: Yes   Pain: pt does not describe pain numerically but reports general pain throughout right side    Social/Functional History  Social/Functional History  Lives With:  (two children)  Type of Home: House  Home Layout: Two level  Home Access: Stairs to enter with rails  Entrance Stairs - Number of Steps: 2  Bathroom Shower/Tub: Tub/Shower unit  Bathroom Toilet: Standard  Bathroom Accessibility: Accessible  Has the patient had two or more falls in the past year or any fall with injury in the past year?: Yes (pt reports falling up the stairs several weeks ago)  ADL Assistance: Independent  Homemaking Assistance: Independent  Ambulation Assistance: Independent  Transfer Assistance: Independent  Active : Yes  Occupation: Unemployed (recieves social secruity)     Objective   Heart Rate: 76  Heart Rate Source: Monitor  BP: 124/76  BP Location: Left upper arm  BP Method: Automatic  Patient Position: Sitting  MAP (Calculated): 92  SpO2: 95 %  O2 Device: None (Room air)     Safety Devices  Type of Devices: All fall risk precautions in place; Bed alarm in place;Call light within reach;Gait belt;Nurse notified; Left in bed     Toilet Transfers  Toilet Transfer: Independent  AROM: Generally decreased, functional  Strength: Generally decreased, functional (LUE WNL, RUE 3+/4- throughout)  Coordination: Generally decreased, functional  Tone: Normal  ADL  Grooming: Supervision (completed oral care and face washing standing at sink for ~5min)  Grooming Skilled Clinical Factors: spvsn for safety and balance  LE Dressing: Independent  Toileting: Independent     Activity Tolerance  Activity Tolerance: Patient tolerated evaluation without incident;Patient tolerated treatment well  Bed mobility  Supine to Sit: Independent  Sit to Supine: Independent  Transfers  Sit to stand: Independent  Stand to sit: Independent     Cognition  Overall Cognitive Status: WNL  Orientation  Overall Orientation Status: Within Normal Limits  Orientation Level: Oriented X4   Education Given To: Patient  Education Provided: Role of Therapy;Plan of Care;Precautions; Fall Prevention Strategies  Education Method: Verbal  Barriers to Learning: None  Education Outcome: Verbalized understanding   Hand Assessment Comment  Hand Assessment Comment: Gross grasp in R hand noted to be 4/5. Towel roll placed in R hand to prevent contracture development     Disease Specific Education: Pt educated on importance of OOB mobility, prevention of complications of bedrest, and general safety during hospitalization. Pt verbalized understanding    AM-PAC Score        AM-PAC Inpatient Daily Activity Raw Score: 24 (07/08/22 1004)  AM-PAC Inpatient ADL T-Scale Score : 57.54 (07/08/22 1004)  ADL Inpatient CMS 0-100% Score: 0 (07/08/22 1004)  ADL Inpatient CMS G-Code Modifier : [address] (07/08/22 1004)    Goals  Short Term Goals  Time Frame for Short term goals: 1 week (7/15) unless otherwise specified  Short Term Goal 1: Pt able to complete STS transfer IND. Goal met 7/8  Short Term Goal 2: Pt able to complete toilet transfer IND. Goal met 7/8  Short Term Goal 3: Pt able to complete LB dressing IND. Goal met 7/8  Patient Goals   Patient goals : Pt did not state; reports she is functioning at her baseline performance       Therapy Time   Individual Concurrent Group Co-treatment   Time In 0854         Time Out 0922         Minutes 28         Timed Code Treatment Minutes: 18 Minutes (10 min eval)     If pt is unable to be seen after this session, please let this note serve as discharge summary. Please see case management note for discharge disposition. Thank you.     Romelia Rodríguez OTR/L

## 2022-07-09 ENCOUNTER — APPOINTMENT (OUTPATIENT)
Dept: MRI IMAGING | Age: 42
End: 2022-07-09
Attending: INTERNAL MEDICINE
Payer: COMMERCIAL

## 2022-07-09 VITALS
TEMPERATURE: 97.4 F | SYSTOLIC BLOOD PRESSURE: 100 MMHG | DIASTOLIC BLOOD PRESSURE: 63 MMHG | RESPIRATION RATE: 16 BRPM | OXYGEN SATURATION: 96 % | HEART RATE: 59 BPM

## 2022-07-09 LAB
A/G RATIO: 1.7 (ref 1.1–2.2)
ALBUMIN SERPL-MCNC: 3.6 G/DL (ref 3.4–5)
ALP BLD-CCNC: 59 U/L (ref 40–129)
ALT SERPL-CCNC: 12 U/L (ref 10–40)
ANION GAP SERPL CALCULATED.3IONS-SCNC: 10 MMOL/L (ref 3–16)
AST SERPL-CCNC: 20 U/L (ref 15–37)
BASOPHILS ABSOLUTE: 0 K/UL (ref 0–0.2)
BASOPHILS RELATIVE PERCENT: 0.4 %
BILIRUB SERPL-MCNC: <0.2 MG/DL (ref 0–1)
BUN BLDV-MCNC: 7 MG/DL (ref 7–20)
CALCIUM SERPL-MCNC: 8.9 MG/DL (ref 8.3–10.6)
CHLORIDE BLD-SCNC: 102 MMOL/L (ref 99–110)
CO2: 28 MMOL/L (ref 21–32)
CREAT SERPL-MCNC: 0.8 MG/DL (ref 0.6–1.1)
EOSINOPHILS ABSOLUTE: 0.1 K/UL (ref 0–0.6)
EOSINOPHILS RELATIVE PERCENT: 1.1 %
GFR AFRICAN AMERICAN: >60
GFR NON-AFRICAN AMERICAN: >60
GLUCOSE BLD-MCNC: 102 MG/DL (ref 70–99)
HCT VFR BLD CALC: 44.9 % (ref 36–48)
HEMOGLOBIN: 15.2 G/DL (ref 12–16)
LYMPHOCYTES ABSOLUTE: 2 K/UL (ref 1–5.1)
LYMPHOCYTES RELATIVE PERCENT: 42.6 %
MCH RBC QN AUTO: 31.5 PG (ref 26–34)
MCHC RBC AUTO-ENTMCNC: 33.9 G/DL (ref 31–36)
MCV RBC AUTO: 92.9 FL (ref 80–100)
MONOCYTES ABSOLUTE: 0.4 K/UL (ref 0–1.3)
MONOCYTES RELATIVE PERCENT: 8.1 %
NEUTROPHILS ABSOLUTE: 2.2 K/UL (ref 1.7–7.7)
NEUTROPHILS RELATIVE PERCENT: 47.8 %
PDW BLD-RTO: 14.1 % (ref 12.4–15.4)
PLATELET # BLD: 143 K/UL (ref 135–450)
PMV BLD AUTO: 9.5 FL (ref 5–10.5)
POTASSIUM REFLEX MAGNESIUM: 3.6 MMOL/L (ref 3.5–5.1)
RBC # BLD: 4.83 M/UL (ref 4–5.2)
SODIUM BLD-SCNC: 140 MMOL/L (ref 136–145)
TOTAL PROTEIN: 5.7 G/DL (ref 6.4–8.2)
VITAMIN D 25-HYDROXY: 66.4 NG/ML
WBC # BLD: 4.6 K/UL (ref 4–11)

## 2022-07-09 PROCEDURE — 80053 COMPREHEN METABOLIC PANEL: CPT

## 2022-07-09 PROCEDURE — G0378 HOSPITAL OBSERVATION PER HR: HCPCS

## 2022-07-09 PROCEDURE — 82306 VITAMIN D 25 HYDROXY: CPT

## 2022-07-09 PROCEDURE — 6370000000 HC RX 637 (ALT 250 FOR IP): Performed by: INTERNAL MEDICINE

## 2022-07-09 PROCEDURE — 6360000002 HC RX W HCPCS: Performed by: INTERNAL MEDICINE

## 2022-07-09 PROCEDURE — 36415 COLL VENOUS BLD VENIPUNCTURE: CPT

## 2022-07-09 PROCEDURE — 96372 THER/PROPH/DIAG INJ SC/IM: CPT

## 2022-07-09 PROCEDURE — 70551 MRI BRAIN STEM W/O DYE: CPT

## 2022-07-09 PROCEDURE — 85025 COMPLETE CBC W/AUTO DIFF WBC: CPT

## 2022-07-09 RX ADMIN — ENOXAPARIN SODIUM 30 MG: 100 INJECTION SUBCUTANEOUS at 10:18

## 2022-07-09 RX ADMIN — ASPIRIN 325 MG: 325 TABLET, COATED ORAL at 10:18

## 2022-07-09 RX ADMIN — OXYCODONE AND ACETAMINOPHEN 1 TABLET: 10; 325 TABLET ORAL at 10:18

## 2022-07-09 RX ADMIN — OXYCODONE AND ACETAMINOPHEN 1 TABLET: 10; 325 TABLET ORAL at 16:04

## 2022-07-09 RX ADMIN — GABAPENTIN 300 MG: 300 CAPSULE ORAL at 14:18

## 2022-07-09 RX ADMIN — GABAPENTIN 300 MG: 300 CAPSULE ORAL at 10:18

## 2022-07-09 RX ADMIN — LISINOPRIL 20 MG: 20 TABLET ORAL at 10:18

## 2022-07-09 RX ADMIN — ATORVASTATIN CALCIUM 80 MG: 80 TABLET, FILM COATED ORAL at 10:18

## 2022-07-09 ASSESSMENT — PAIN DESCRIPTION - LOCATION: LOCATION: BACK;ARM

## 2022-07-09 ASSESSMENT — PAIN SCALES - GENERAL
PAINLEVEL_OUTOF10: 9
PAINLEVEL_OUTOF10: 8

## 2022-07-09 ASSESSMENT — PAIN DESCRIPTION - ORIENTATION: ORIENTATION: RIGHT

## 2022-07-09 NOTE — DISCHARGE SUMMARY
Hospital Medicine Discharge Summary    Patient ID: Kaveh Phillips      Patient's PCP: BEATRIS Brand - CNP    Admit Date: 7/8/2022     Discharge Date:   07/09/22    Admitting Provider: Niko Pinon MD     Discharge Provider: Nathen Brenner MD     Discharge Diagnoses: Active Hospital Problems    Diagnosis     Stroke-like symptoms [R29.90]      Priority: Medium    COVID-19 viremia [U07.1]      Priority: Medium       The patient was seen and examined on day of discharge and this discharge summary is in conjunction with any daily progress note from day of discharge. Hospital Course:   40 y.o. female who presented to [facility] with right sided numbness. Patient has prior history of large CVA and craniotomy. She has baseline right sided weakness and speech difficulties. Yesterday, she felt her right leg and arm were becoming tingly and losing sensation. Despite her baseline weakness, she has no chronic sensory loss. She also noted some blurring vision. This lasted for several hours but was resolving by the time she came to the ED. Patient was found to be COVID positive in the ED but has no symptoms of this. Right sided numbness, H/O CVA  - increased above baseline. Has chronic deficits from prior CVA  - CT head negative for acute changes  - CTA head and neck negative  - MRI brain negative for acute CVA  - neurology consulted  - continue home ASA, statin  - suspect related to COVID 19 diagnosis     COVID 19  - stable on room air  - asymptomatic  - supportive care     HTN  - well controlled  - continue lisinopril     HLD  - continue statin    Physical Exam Performed:     /63   Pulse 59   Temp 97.4 °F (36.3 °C) (Oral)   Resp 16   SpO2 96%       General appearance:  No apparent distress, appears stated age and cooperative. HEENT:  Normal cephalic, atraumatic. Left skull defect. Pupils equal, round, and reactive to light. Extra ocular muscles intact.  Conjunctivae/corneas clear.  Neck: Supple, with full range of motion. No jugular venous distention. Trachea midline. Respiratory:  Normal respiratory effort. Clear to auscultation, bilaterally without Rales/Wheezes/Rhonchi. Cardiovascular:  Regular rate and rhythm with normal S1/S2 without murmurs, rubs or gallops. Abdomen: Soft, non-tender, non-distended with normal bowel sounds. Musculoskeletal:  No clubbing, cyanosis or edema bilaterally. Full range of motion without deformity. Skin: Skin color, texture, turgor normal.  No rashes or lesions. Neurologic:  Right sided arm and leg weakness with spasticity. No sensory loss. Right facial droop. Aphasia. Psychiatric:  Alert and oriented, thought content appropriate, normal insight  Capillary Refill: Brisk,3 seconds, normal  Peripheral Pulses: +2 palpable, equal bilaterally     Labs: For convenience and continuity at follow-up the following most recent labs are provided:      CBC:    Lab Results   Component Value Date/Time    WBC 4.6 07/09/2022 05:10 AM    HGB 15.2 07/09/2022 05:10 AM    HCT 44.9 07/09/2022 05:10 AM     07/09/2022 05:10 AM       Renal:    Lab Results   Component Value Date/Time     07/09/2022 05:10 AM    K 3.6 07/09/2022 05:10 AM     07/09/2022 05:10 AM    CO2 28 07/09/2022 05:10 AM    BUN 7 07/09/2022 05:10 AM    CREATININE 0.8 07/09/2022 05:10 AM    CALCIUM 8.9 07/09/2022 05:10 AM         Significant Diagnostic Studies    Radiology:   MRI brain without contrast   Final Result   No acute infarct.       RECOMMENDATIONS:   Unavailable                Consults:     IP CONSULT TO NEUROLOGY    Disposition:  home     Condition at Discharge: Stable    Discharge Instructions/Follow-up:  Follow up with PCP within 1-2 weeks    Code Status:  Full Code     Activity: activity as tolerated    Diet: regular diet      Discharge Medications:     Current Discharge Medication List           Details   oxyCODONE-acetaminophen (PERCOCET)  MG per tablet Take 1 tablet by mouth every 6 hours as needed. Luke Tejeda aspirin 325 MG EC tablet Take 1 tablet by mouth daily  Qty: 30 tablet, Refills: 3      cyanocobalamin 1000 MCG tablet Take 1,000 mcg by mouth      gabapentin (NEURONTIN) 300 MG capsule Take 300 mg by mouth 3 times daily. Margurettmimi Tejeda lisinopril (PRINIVIL;ZESTRIL) 20 MG tablet Take 20 mg by mouth daily      atorvastatin (LIPITOR) 80 MG tablet Take 80 mg by mouth daily      donepezil (ARICEPT) 5 MG tablet Take 5 mg by mouth nightly      polyethylene glycol (GLYCOLAX) packet Take 17 g by mouth daily as needed for Constipation             Time Spent on discharge is more than 20 minutes in the examination, evaluation, counseling and review of medications and discharge plan. Signed:    Saida Patino MD   7/9/2022      Thank you BEATRIS Jara - EFRAIN for the opportunity to be involved in this patient's care. If you have any questions or concerns, please feel free to contact me at [phone].

## 2022-07-09 NOTE — PROGRESS NOTES
Patient indicated during questioning for MRI screening that she didn't have brain surgery in the past. Speaking with sister after the fact she had a craniotomy in 2018 due to stroke and other issues. The only thing she told me she had was body piercing's. Nothing in the patient history as well. Patient was alert and oriented x4.

## 2022-07-11 ENCOUNTER — CARE COORDINATION (OUTPATIENT)
Dept: CASE MANAGEMENT | Age: 42
End: 2022-07-11

## 2022-07-11 NOTE — CARE COORDINATION
[facility] Transitions Initial Follow Up Call    Call within 2 business days of discharge: Yes    Patient: Rolando Miguel Patient : 1980   MRN: [id number]  Reason for Admission: Covid   Discharge Date: 22 RARS: No data recorded    Last Discharge [facility]       Complaint Diagnosis Description Type Department Provider    22   Admission (Discharged) Hernán Longoria MD           Attempted to reach patient via phone for initial post hospital transition call. CTN received message no voicemail box setup at this time. Care Transitions 24 Hour Call    Care Transitions Interventions         Follow Up  No future appointments.     Yanique ZARATEN, RN, [facility]  Care Transition Nurse  [phone] mobile

## 2022-07-12 ENCOUNTER — CARE COORDINATION (OUTPATIENT)
Dept: CASE MANAGEMENT | Age: 42
End: 2022-07-12

## 2022-07-12 NOTE — CARE COORDINATION
[facility] Transitions Initial Follow Up Call    Call within 2 business days of discharge: Yes    Patient: Terence Rueda Patient : 1980   MRN: [id number]  Reason for Admission: CVA; [address]  Discharge Date: 22 RARS: No data recorded    Last Discharge [facility]       Complaint Diagnosis Description Type Department Provider    22   Admission (Discharged) Suzanne Pham MD           Second attempt to reach pt for follow up call. Unable to leave message due to voicemail not set up. Care Transitions 24 Hour Call    Care Transitions Interventions         Follow Up  No future appointments.     Maryann Primus

## 2022-08-10 ENCOUNTER — HOSPITAL ENCOUNTER (OUTPATIENT)
Dept: OCCUPATIONAL THERAPY | Age: 42
Setting detail: THERAPIES SERIES
Discharge: HOME | End: 2022-08-10

## 2023-05-23 ENCOUNTER — HOSPITAL ENCOUNTER (OUTPATIENT)
Age: 43
Discharge: HOME | End: 2023-05-23
Payer: COMMERCIAL

## 2023-05-23 LAB
ALBUMIN SERPL-MCNC: 3.8 G/DL (ref 3.4–5)
ALBUMIN/GLOB SERPL: 1.4 {RATIO} (ref 1.1–2.2)
ALP SERPL-CCNC: 77 U/L (ref 40–129)
ALT SERPL-CCNC: 21 U/L (ref 10–40)
ANION GAP SERPL CALCULATED.3IONS-SCNC: 8 MMOL/L (ref 3–16)
AST SERPL-CCNC: 14 U/L (ref 15–37)
BILIRUB SERPL-MCNC: 0.3 MG/DL (ref 0–1)
BUN SERPL-MCNC: 11 MG/DL (ref 7–20)
CALCIUM SERPL-MCNC: 9.2 MG/DL (ref 8.3–10.6)
CHLORIDE SERPL-SCNC: 106 MMOL/L (ref 99–110)
CO2 SERPL-SCNC: 24 MMOL/L (ref 21–32)
CREAT SERPL-MCNC: 0.7 MG/DL (ref 0.6–1.1)
DEPRECATED RDW RBC AUTO: 13.8 % (ref 12.4–15.4)
GFR SERPLBLD CREATININE-BSD FMLA CKD-EPI: >60 ML/MIN/{1.73_M2}
GLUCOSE SERPL-MCNC: 113 MG/DL (ref 70–99)
HCT VFR BLD AUTO: 45.4 % (ref 36–48)
HGB BLD-MCNC: 15.3 G/DL (ref 12–16)
MCH RBC QN AUTO: 30.8 PG (ref 26–34)
MCHC RBC AUTO-ENTMCNC: 33.7 G/DL (ref 31–36)
MCV RBC AUTO: 91.2 FL (ref 80–100)
PLATELET # BLD AUTO: 257 K/UL (ref 135–450)
PMV BLD AUTO: 9.2 FL (ref 5–10.5)
POTASSIUM SERPL-SCNC: 4 MMOL/L (ref 3.5–5.1)
PROT SERPL-MCNC: 6.6 G/DL (ref 6.4–8.2)
RBC # BLD AUTO: 4.97 M/UL (ref 4–5.2)
SODIUM SERPL-SCNC: 138 MMOL/L (ref 136–145)
WBC # BLD AUTO: 15.4 K/UL (ref 4–11)

## 2023-05-23 PROCEDURE — 82607 VITAMIN B-12: CPT

## 2023-05-23 PROCEDURE — 86803 HEPATITIS C AB TEST: CPT

## 2023-05-23 PROCEDURE — 80053 COMPREHEN METABOLIC PANEL: CPT

## 2023-05-23 PROCEDURE — 83036 HEMOGLOBIN GLYCOSYLATED A1C: CPT

## 2023-05-23 PROCEDURE — 86376 MICROSOMAL ANTIBODY EACH: CPT

## 2023-05-23 PROCEDURE — 80061 LIPID PANEL: CPT

## 2023-05-23 PROCEDURE — 84481 FREE ASSAY (FT-3): CPT

## 2023-05-23 PROCEDURE — 84443 ASSAY THYROID STIM HORMONE: CPT

## 2023-05-23 PROCEDURE — 85027 COMPLETE CBC AUTOMATED: CPT

## 2023-05-23 PROCEDURE — 82306 VITAMIN D 25 HYDROXY: CPT

## 2023-05-24 LAB
25(OH)D3 SERPL-MCNC: 29 NG/ML
BASOPHILS # BLD: 0 K/UL (ref 0–0.2)
BASOPHILS NFR BLD: 0.1 %
CHOLEST SERPL-MCNC: 114 MG/DL (ref 0–199)
EOSINOPHIL # BLD: 0.1 K/UL (ref 0–0.6)
EOSINOPHIL NFR BLD: 0.7 %
EST. AVERAGE GLUCOSE BLD GHB EST-MCNC: 114 MG/DL
HBA1C MFR BLD: 5.6 %
HCV AB SERPL QL IA: NORMAL
HDLC SERPL-MCNC: 40 MG/DL (ref 40–60)
LDLC SERPL CALC-MCNC: 54 MG/DL
LYMPHOCYTES # BLD: 3.1 K/UL (ref 1–5.1)
LYMPHOCYTES NFR BLD: 18.9 %
MONOCYTES # BLD: 0.4 K/UL (ref 0–1.3)
MONOCYTES NFR BLD: 2.8 %
NEUTROPHILS # BLD: 12.6 K/UL (ref 1.7–7.7)
NEUTROPHILS NFR BLD: 77.5 %
T3FREE SERPL-MCNC: 2.9 PG/ML (ref 2.3–4.2)
THYROPEROXIDASE AB SERPL IA-ACNC: <5 IU/ML
TRIGL SERPL-MCNC: 99 MG/DL (ref 0–150)
TSH SERPL DL<=0.005 MIU/L-ACNC: 0.58 UIU/ML (ref 0.27–4.2)
VIT B12 SERPL-MCNC: 699 PG/ML (ref 211–911)
VLDLC SERPL CALC-MCNC: 20 MG/DL

## 2024-03-29 ENCOUNTER — HOSPITAL ENCOUNTER (EMERGENCY)
Age: 44
Discharge: HOME | End: 2024-03-30
Payer: COMMERCIAL

## 2024-03-29 DIAGNOSIS — R11.2 NAUSEA AND VOMITING, UNSPECIFIED VOMITING TYPE: Primary | ICD-10-CM

## 2024-03-29 PROCEDURE — 99284 EMERGENCY DEPT VISIT MOD MDM: CPT

## 2024-03-29 ASSESSMENT — PAIN DESCRIPTION - DESCRIPTORS: DESCRIPTORS: CRAMPING

## 2024-03-29 ASSESSMENT — PAIN DESCRIPTION - LOCATION: LOCATION: ABDOMEN

## 2024-03-29 ASSESSMENT — PAIN - FUNCTIONAL ASSESSMENT: PAIN_FUNCTIONAL_ASSESSMENT: 0-10

## 2024-03-29 ASSESSMENT — PAIN DESCRIPTION - ORIENTATION: ORIENTATION: OTHER (COMMENT)

## 2024-03-29 ASSESSMENT — PAIN SCALES - GENERAL: PAINLEVEL_OUTOF10: 6

## 2024-03-30 ENCOUNTER — APPOINTMENT (OUTPATIENT)
Dept: GENERAL RADIOLOGY | Age: 44
End: 2024-03-30
Payer: COMMERCIAL

## 2024-03-30 VITALS
RESPIRATION RATE: 16 BRPM | TEMPERATURE: 98.5 F | OXYGEN SATURATION: 100 % | BODY MASS INDEX: 30.36 KG/M2 | DIASTOLIC BLOOD PRESSURE: 83 MMHG | HEIGHT: 69 IN | SYSTOLIC BLOOD PRESSURE: 117 MMHG | HEART RATE: 74 BPM | WEIGHT: 205 LBS

## 2024-03-30 LAB
ALBUMIN SERPL-MCNC: 3.5 G/DL (ref 3.4–5)
ALBUMIN/GLOB SERPL: 1.1 {RATIO} (ref 1.1–2.2)
ALP SERPL-CCNC: 80 U/L (ref 40–129)
ALT SERPL-CCNC: 22 U/L (ref 10–40)
ANION GAP SERPL CALCULATED.3IONS-SCNC: 14 MMOL/L (ref 3–16)
AST SERPL-CCNC: 29 U/L (ref 15–37)
B-HCG SERPL EIA 3RD IS-ACNC: <5 MIU/ML
BACTERIA URNS QL MICRO: ABNORMAL /HPF
BASOPHILS # BLD: 0.1 K/UL (ref 0–0.2)
BASOPHILS NFR BLD: 1 %
BILIRUB SERPL-MCNC: 0.3 MG/DL (ref 0–1)
BILIRUB UR QL STRIP.AUTO: NEGATIVE
BUN SERPL-MCNC: 11 MG/DL (ref 7–20)
CALCIUM SERPL-MCNC: 8.5 MG/DL (ref 8.3–10.6)
CHLORIDE SERPL-SCNC: 100 MMOL/L (ref 99–110)
CLARITY UR: CLEAR
CO2 SERPL-SCNC: 19 MMOL/L (ref 21–32)
COLOR UR: YELLOW
CREAT SERPL-MCNC: 0.8 MG/DL (ref 0.6–1.1)
DEPRECATED RDW RBC AUTO: 14 % (ref 12.4–15.4)
EOSINOPHIL # BLD: 0.2 K/UL (ref 0–0.6)
EOSINOPHIL NFR BLD: 1.6 %
EPI CELLS #/AREA URNS HPF: >100 /HPF (ref 0–5)
GFR SERPLBLD CREATININE-BSD FMLA CKD-EPI: >90 ML/MIN/{1.73_M2}
GLUCOSE SERPL-MCNC: 83 MG/DL (ref 70–99)
GLUCOSE UR STRIP.AUTO-MCNC: NEGATIVE MG/DL
HCT VFR BLD AUTO: 46.5 % (ref 36–48)
HGB BLD-MCNC: 15.6 G/DL (ref 12–16)
HGB UR QL STRIP.AUTO: ABNORMAL
KETONES UR STRIP.AUTO-MCNC: NEGATIVE MG/DL
LEUKOCYTE ESTERASE UR QL STRIP.AUTO: NEGATIVE
LIPASE SERPL-CCNC: 87 U/L (ref 13–60)
LYMPHOCYTES # BLD: 4.6 K/UL (ref 1–5.1)
LYMPHOCYTES NFR BLD: 39.9 %
MCH RBC QN AUTO: 32 PG (ref 26–34)
MCHC RBC AUTO-ENTMCNC: 33.5 G/DL (ref 31–36)
MCV RBC AUTO: 95.3 FL (ref 80–100)
MONOCYTES # BLD: 0.9 K/UL (ref 0–1.3)
MONOCYTES NFR BLD: 8 %
MUCOUS THREADS #/AREA URNS LPF: ABNORMAL /LPF
NEUTROPHILS # BLD: 5.7 K/UL (ref 1.7–7.7)
NEUTROPHILS NFR BLD: 49.5 %
NITRITE UR QL STRIP.AUTO: NEGATIVE
PH UR STRIP.AUTO: 6 [PH] (ref 5–8)
PLATELET # BLD AUTO: 261 K/UL (ref 135–450)
PMV BLD AUTO: 10.4 FL (ref 5–10.5)
POTASSIUM SERPL-SCNC: 4.1 MMOL/L (ref 3.5–5.1)
PROT SERPL-MCNC: 6.8 G/DL (ref 6.4–8.2)
PROT UR STRIP.AUTO-MCNC: NEGATIVE MG/DL
RBC # BLD AUTO: 4.88 M/UL (ref 4–5.2)
RBC #/AREA URNS HPF: ABNORMAL /HPF (ref 0–4)
SODIUM SERPL-SCNC: 133 MMOL/L (ref 136–145)
SP GR UR STRIP.AUTO: 1.02 (ref 1–1.03)
UA COMPLETE W REFLEX CULTURE PNL UR: ABNORMAL
UA DIPSTICK W REFLEX MICRO PNL UR: YES
URN SPEC COLLECT METH UR: ABNORMAL
UROBILINOGEN UR STRIP-ACNC: >=8 E.U./DL
WBC # BLD AUTO: 11.5 K/UL (ref 4–11)
WBC #/AREA URNS HPF: ABNORMAL /HPF (ref 0–5)

## 2024-03-30 PROCEDURE — 80053 COMPREHEN METABOLIC PANEL: CPT

## 2024-03-30 PROCEDURE — 2580000003 HC RX 258: Performed by: PHYSICIAN ASSISTANT

## 2024-03-30 PROCEDURE — 74018 RADEX ABDOMEN 1 VIEW: CPT

## 2024-03-30 PROCEDURE — 84702 CHORIONIC GONADOTROPIN TEST: CPT

## 2024-03-30 PROCEDURE — 85025 COMPLETE CBC W/AUTO DIFF WBC: CPT

## 2024-03-30 PROCEDURE — 81001 URINALYSIS AUTO W/SCOPE: CPT

## 2024-03-30 PROCEDURE — 83690 ASSAY OF LIPASE: CPT

## 2024-03-30 RX ORDER — 0.9 % SODIUM CHLORIDE 0.9 %
1000 INTRAVENOUS SOLUTION INTRAVENOUS ONCE
Status: COMPLETED | OUTPATIENT
Start: 2024-03-30 | End: 2024-03-30

## 2024-03-30 RX ORDER — ONDANSETRON 4 MG/1
4 TABLET, FILM COATED ORAL EVERY 8 HOURS PRN
Qty: 20 TABLET | Refills: 0 | Status: SHIPPED | OUTPATIENT
Start: 2024-03-30

## 2024-03-30 RX ADMIN — SODIUM CHLORIDE 1000 ML: 9 INJECTION, SOLUTION INTRAVENOUS at 01:44

## 2024-03-30 NOTE — ED NOTES
Ok for d/c. Able to eat a bag of pretzels and drink 240ml of water. No c/o abd pain, n/v/d. Edu pt re:new home rx. Pt verbalized understanding. Left, ambulatory w/ family and all personal belongings.

## 2024-03-30 NOTE — ED PROVIDER NOTES
Value Ref Range    Sodium 133 (L) 136 - 145 mmol/L    Potassium reflex Magnesium 4.1 3.5 - 5.1 mmol/L    Chloride 100 99 - 110 mmol/L    CO2 19 (L) 21 - 32 mmol/L    Anion Gap 14 3 - 16    Glucose 83 70 - 99 mg/dL    BUN 11 7 - 20 mg/dL    Creatinine 0.8 0.6 - 1.1 mg/dL    Est, Glom Filt Rate >90 >60    Calcium 8.5 8.3 - 10.6 mg/dL    Total Protein 6.8 6.4 - 8.2 g/dL    Albumin 3.5 3.4 - 5.0 g/dL    Albumin/Globulin Ratio 1.1 1.1 - 2.2    Total Bilirubin 0.3 0.0 - 1.0 mg/dL    Alkaline Phosphatase 80 40 - 129 U/L    ALT 22 10 - 40 U/L    AST 29 15 - 37 U/L   Lipase   Result Value Ref Range    Lipase 87.0 (H) 13.0 - 60.0 U/L   Urinalysis with Reflex to Culture    Specimen: Urine   Result Value Ref Range    Color, UA Yellow Straw/Yellow    Clarity, UA Clear Clear    Glucose, Ur Negative Negative mg/dL    Bilirubin Urine Negative Negative    Ketones, Urine Negative Negative mg/dL    Specific Gravity, UA 1.020 1.005 - 1.030    Blood, Urine TRACE-INTACT (A) Negative    pH, UA 6.0 5.0 - 8.0    Protein, UA Negative Negative mg/dL    Urobilinogen, Urine >=8.0 (A) <2.0 E.U./dL    Nitrite, Urine Negative Negative    Leukocyte Esterase, Urine Negative Negative    Microscopic Examination YES     Urine Type NotGiven     Urine Reflex to Culture Not Indicated    HCG, Quantitative, Serum   Result Value Ref Range    hCG Quant <5.0 <5.0 mIU/mL   Microscopic Urinalysis   Result Value Ref Range    Mucus, UA Rare (A) None Seen /LPF    WBC, UA 3-5 0 - 5 /HPF    RBC, UA 0-2 0 - 4 /HPF    Epithelial Cells, UA >100 (A) 0 - 5 /HPF    Bacteria, UA 3+ (A) None Seen /HPF         I estimate there is LOW risk for ACUTE APPENDICITIS, BOWEL OBSTRUCTION, CHOLECYSTITIS, DIVERTICULITIS, INCARCERATED HERNIA, PANCREATITIS, or PERFORATED BOWEL or ULCER, thus I consider the discharge disposition reasonable. Also, there is no evidence or peritonitis, sepsis, or toxicity. Delia Stiles and I have discussed the diagnosis and risks, and we agree with  discharging home to follow-up with their primary doctor. We also discussed returning to the Emergency Department immediately if new or worsening symptoms occur. We have discussed the symptoms which are most concerning (e.g., bloody stool, fever, changing or worsening pain, vomiting) that necessitate immediate return.     FINAL Impression    1. Nausea and vomiting, unspecified vomiting type        Blood pressure 121/86, pulse 82, temperature 98.5 °F (36.9 °C), temperature source Oral, resp. rate 16, height 1.753 m (5' 9\"), weight 93 kg (205 lb), SpO2 99 %, not currently breastfeeding.          I am the Primary Clinician of Record.    FINAL IMPRESSION      1. Nausea and vomiting, unspecified vomiting type          DISPOSITION/PLAN     DISPOSITION Decision To Discharge 03/30/2024 02:44:46 AM      PATIENT REFERRED TO:  [facility]  ED  [address] [facility] [address] [phone]  Go to   If symptoms worsen    Ana Paula Solomon, APRN - CNP  [address] [place] [address] [phone]            DISCHARGE MEDICATIONS:  New Prescriptions    ONDANSETRON (ZOFRAN) 4 MG TABLET    Take 1 tablet by mouth every 8 hours as needed for Nausea       DISCONTINUED MEDICATIONS:  Discontinued Medications    No medications on file              (Please note that portions of this note were completed with a voice recognition program.  Efforts were made to edit the dictations but occasionally words are mis-transcribed.)    LUKASZ Fair (electronically signed)            Naina Vazquez PA  03/30/24 0254

## 2024-07-11 ENCOUNTER — HOSPITAL ENCOUNTER (EMERGENCY)
Age: 44
Discharge: HOME | End: 2024-07-11
Attending: EMERGENCY MEDICINE
Payer: COMMERCIAL

## 2024-07-11 VITALS
RESPIRATION RATE: 18 BRPM | HEART RATE: 76 BPM | HEIGHT: 69 IN | TEMPERATURE: 98.3 F | SYSTOLIC BLOOD PRESSURE: 108 MMHG | DIASTOLIC BLOOD PRESSURE: 68 MMHG | WEIGHT: 202 LBS | OXYGEN SATURATION: 99 % | BODY MASS INDEX: 29.92 KG/M2

## 2024-07-11 DIAGNOSIS — S81.812A LACERATION OF LEFT LOWER EXTREMITY, INITIAL ENCOUNTER: Primary | ICD-10-CM

## 2024-07-11 PROCEDURE — 6360000002 HC RX W HCPCS: Performed by: EMERGENCY MEDICINE

## 2024-07-11 PROCEDURE — 90715 TDAP VACCINE 7 YRS/> IM: CPT | Performed by: EMERGENCY MEDICINE

## 2024-07-11 PROCEDURE — 99284 EMERGENCY DEPT VISIT MOD MDM: CPT

## 2024-07-11 PROCEDURE — 12004 RPR S/N/AX/GEN/TRK7.6-12.5CM: CPT

## 2024-07-11 PROCEDURE — 90471 IMMUNIZATION ADMIN: CPT | Performed by: EMERGENCY MEDICINE

## 2024-07-11 RX ORDER — FLUOXETINE HYDROCHLORIDE 20 MG/1
20 CAPSULE ORAL DAILY
COMMUNITY

## 2024-07-11 RX ORDER — ALBUTEROL SULFATE 90 UG/1
2 AEROSOL, METERED RESPIRATORY (INHALATION) EVERY 4 HOURS PRN
COMMUNITY

## 2024-07-11 RX ORDER — CEPHALEXIN 500 MG/1
500 CAPSULE ORAL 4 TIMES DAILY
Qty: 28 CAPSULE | Refills: 0 | Status: SHIPPED | OUTPATIENT
Start: 2024-07-11 | End: 2024-07-18

## 2024-07-11 RX ADMIN — TETANUS TOXOID, REDUCED DIPHTHERIA TOXOID AND ACELLULAR PERTUSSIS VACCINE, ADSORBED 0.5 ML: 5; 2.5; 8; 8; 2.5 SUSPENSION INTRAMUSCULAR at 14:35

## 2024-07-11 ASSESSMENT — LIFESTYLE VARIABLES
HOW OFTEN DO YOU HAVE A DRINK CONTAINING ALCOHOL: NEVER
HOW MANY STANDARD DRINKS CONTAINING ALCOHOL DO YOU HAVE ON A TYPICAL DAY: PATIENT DOES NOT DRINK

## 2024-07-11 NOTE — DISCHARGE INSTRUCTIONS
Gently clean it once or twice a day while showering  Apply Polysporin for 3 to 5 days  And then let it dry out  Take Tylenol every 4 hours for pain  Take ibuprofen every 6 hours for pain  Take Keflex antibiotic x 7 days

## 2024-07-11 NOTE — ED NOTES
AVS provided and reviewed with the patient. The patient verbalized understanding of care at home, follow up care, wound care and emergent symptoms to return for. The patient verbalized understanding of completing entire medication course as prescribed. No questions or concerns verbalized at this time. The patient is alert, oriented, stable, and ambulatory out of the department at the time of discharge.

## 2024-07-11 NOTE — ED PROVIDER NOTES
[facility] EMERGENCY DEPARTMENT  EMERGENCY DEPARTMENT ENCOUNTER      Pt Name: Delia Stiles  MRN: [id number]  Birthdate 1980  Date of evaluation: 2024  Provider: FERMÍN RAMIRES MD    CHIEF COMPLAINT       Chief Complaint   Patient presents with    Laceration         HISTORY OF PRESENT ILLNESS   (Location/Symptom, Timing/Onset, Context/Setting, Quality, Duration, Modifying Factors, Severity)  Note limiting factors.     Delia Stiles is a 44 y.o. female who presents to the emergency department     Patient fell into a bed rail inside the house it was clean she sustained a fairly large show seven 8 cm laceration, vertical to the left anterior tibial surface no signs of any fracture no signs of foreign body on exploration no history of foreign body patient        Nursing Notes were reviewed.    REVIEW OF SYSTEMS    (2-9 systems for level 4, 10 or more for level 5)     Review of Systems    Except as noted above the remainder of the review of systems was reviewed and negative.       PAST MEDICAL HISTORY     Past Medical History:   Diagnosis Date    Cerebral artery occlusion with cerebral infarction (HCC)     Cerebral infarction (HCC)     Chronic back pain     Depression     Hyperlipidemia     Hypertension          SURGICAL HISTORY       Past Surgical History:   Procedure Laterality Date     SECTION      TONSILLECTOMY           CURRENT MEDICATIONS       Previous Medications    ALBUTEROL SULFATE HFA (VENTOLIN HFA) 108 (90 BASE) MCG/ACT INHALER    Inhale 2 puffs into the lungs every 4 hours as needed    ASPIRIN 325 MG EC TABLET    Take 1 tablet by mouth daily    ATORVASTATIN (LIPITOR) 80 MG TABLET    Take 80 mg by mouth daily    CYANOCOBALAMIN 1000 MCG TABLET    Take 1,000 mcg by mouth    DONEPEZIL (ARICEPT) 5 MG TABLET    Take 5 mg by mouth nightly    DULOXETINE HCL PO    Take 1 capsule by mouth daily    FLUOXETINE (PROZAC) 20 MG CAPSULE    Take 1 capsule by mouth daily    GABAPENTIN (NEURONTIN) 300 MG  signs of a old intracranial event with signs of skull indentation  Speech is a little bit slow cognitively maybe a little bit impaired also.  Her friend is alert and oriented provided and supplemented her history    DIAGNOSTIC RESULTS     EKG: All EKG's are interpreted by the Emergency Department Physician who either signs or Co-signs this chart in the absence of a cardiologist.        RADIOLOGY:   Non-plain film images such as CT, Ultrasound and MRI are read by the radiologist. Plain radiographic images are visualized and preliminarily interpreted by the emergency physician with the below findings:        Interpretation per the Radiologist below, if available at the time of this note:    No orders to display           LABS:  No results found for this visit on 07/11/24.         EMERGENCY DEPARTMENT COURSE and DIFFERENTIAL DIAGNOSIS/MDM:     Vitals:    07/11/24 1328   BP: 108/68   Pulse: 76   Resp: 18   Temp: 98.3 °F (36.8 °C)   TempSrc: Oral   SpO2: 99%   Weight: 91.6 kg (202 lb)   Height: 1.753 m (5' 9\")           MDM    Historian: Patient and the patient's friend  Limitations: None  History of present illness apparently tripped and fell into a bed rail sustaining a laceration 8 cm vertical unsure of her last tetanus therefore she was updated      Physical exam: Vital signs stable  Patient has a 8 cm vertical laceration into the subcutaneous area no foreign body wound was irrigated no deep structure violation was noted except subcutaneous fat  Patient underwent of 5 vertical mattress stitches    Sutures placed please see my procedure note below prescriptions given for Keflex    REASSESSMENT          CRITICAL CARE TIME     CONSULTS:  None      PROCEDURES:     Lac Repair    Date/Time: 7/11/2024 2:35 PM    Performed by: Pacheco Gambino MD  Authorized by: Pacheco Gambino MD    Consent:     Consent obtained:  Verbal    Consent given by:  Patient    Risks, benefits, and alternatives were discussed: yes      Risks discussed:

## 2024-07-22 ENCOUNTER — HOSPITAL ENCOUNTER (OUTPATIENT)
Dept: GENERAL RADIOLOGY | Age: 44
Discharge: HOME | End: 2024-07-22
Payer: COMMERCIAL

## 2024-07-22 ENCOUNTER — HOSPITAL ENCOUNTER (OUTPATIENT)
Age: 44
Discharge: HOME | End: 2024-07-22
Payer: COMMERCIAL

## 2024-07-22 DIAGNOSIS — M54.50 LOW BACK PAIN, UNSPECIFIED BACK PAIN LATERALITY, UNSPECIFIED CHRONICITY, UNSPECIFIED WHETHER SCIATICA PRESENT: ICD-10-CM

## 2024-07-22 PROCEDURE — 72100 X-RAY EXAM L-S SPINE 2/3 VWS: CPT

## 2024-12-11 ENCOUNTER — HOSPITAL ENCOUNTER (OUTPATIENT)
Dept: MRI IMAGING | Age: 44
Discharge: HOME | End: 2024-12-11
Payer: COMMERCIAL

## 2024-12-11 DIAGNOSIS — M54.16 RADICULOPATHY, LUMBAR REGION: ICD-10-CM

## 2024-12-11 PROCEDURE — 72148 MRI LUMBAR SPINE W/O DYE: CPT

## 2025-03-16 ENCOUNTER — HOSPITAL ENCOUNTER (EMERGENCY)
Age: 45
Discharge: ANOTHER HOSPITAL | End: 2025-03-16
Attending: STUDENT IN AN ORGANIZED HEALTH CARE EDUCATION/TRAINING PROGRAM
Payer: COMMERCIAL

## 2025-03-16 ENCOUNTER — APPOINTMENT (OUTPATIENT)
Dept: CT IMAGING | Age: 45
End: 2025-03-16
Payer: COMMERCIAL

## 2025-03-16 ENCOUNTER — APPOINTMENT (OUTPATIENT)
Dept: GENERAL RADIOLOGY | Age: 45
End: 2025-03-16
Payer: COMMERCIAL

## 2025-03-16 VITALS
TEMPERATURE: 98 F | DIASTOLIC BLOOD PRESSURE: 75 MMHG | WEIGHT: 202 LBS | BODY MASS INDEX: 29.92 KG/M2 | OXYGEN SATURATION: 100 % | HEART RATE: 54 BPM | HEIGHT: 69 IN | RESPIRATION RATE: 14 BRPM | SYSTOLIC BLOOD PRESSURE: 143 MMHG

## 2025-03-16 DIAGNOSIS — R41.82 ALTERED MENTAL STATUS, UNSPECIFIED ALTERED MENTAL STATUS TYPE: Primary | ICD-10-CM

## 2025-03-16 LAB
ALBUMIN SERPL-MCNC: 3.4 G/DL (ref 3.4–5)
ALBUMIN/GLOB SERPL: 1.4 {RATIO} (ref 1.1–2.2)
ALP SERPL-CCNC: 66 U/L (ref 40–129)
ALT SERPL-CCNC: 15 U/L (ref 10–40)
ANION GAP SERPL CALCULATED.3IONS-SCNC: 10 MMOL/L (ref 3–16)
APAP SERPL-MCNC: <5 UG/ML (ref 10–30)
AST SERPL-CCNC: 9 U/L (ref 15–37)
BASE EXCESS BLDV CALC-SCNC: -1 MMOL/L (ref -3–3)
BASOPHILS # BLD: 0.1 K/UL (ref 0–0.2)
BASOPHILS NFR BLD: 0.7 %
BILIRUB SERPL-MCNC: 0.4 MG/DL (ref 0–1)
BUN SERPL-MCNC: 9 MG/DL (ref 7–20)
CALCIUM SERPL-MCNC: 8.5 MG/DL (ref 8.3–10.6)
CHLORIDE SERPL-SCNC: 105 MMOL/L (ref 99–110)
CO2 BLDV-SCNC: 24 MMOL/L
CO2 SERPL-SCNC: 25 MMOL/L (ref 21–32)
COHGB MFR BLDV: 6.3 % (ref 0–1.5)
CREAT SERPL-MCNC: 0.8 MG/DL (ref 0.6–1.1)
DEPRECATED RDW RBC AUTO: 14.2 % (ref 12.4–15.4)
EOSINOPHIL # BLD: 0.1 K/UL (ref 0–0.6)
EOSINOPHIL NFR BLD: 1.8 %
ETHANOLAMINE SERPL-MCNC: NORMAL MG/DL (ref 0–0.08)
GFR SERPLBLD CREATININE-BSD FMLA CKD-EPI: >90 ML/MIN/{1.73_M2}
GLUCOSE SERPL-MCNC: 94 MG/DL (ref 70–99)
HCO3 BLDV-SCNC: 23.2 MMOL/L (ref 23–29)
HCT VFR BLD AUTO: 45.3 % (ref 36–48)
HGB BLD-MCNC: 15.2 G/DL (ref 12–16)
INR PPP: 0.96 (ref 0.85–1.15)
LACTATE BLDV-SCNC: 1.4 MMOL/L (ref 0.4–1.9)
LYMPHOCYTES # BLD: 2.3 K/UL (ref 1–5.1)
LYMPHOCYTES NFR BLD: 28.5 %
MCH RBC QN AUTO: 31.3 PG (ref 26–34)
MCHC RBC AUTO-ENTMCNC: 33.5 G/DL (ref 31–36)
MCV RBC AUTO: 93.4 FL (ref 80–100)
METHGB MFR BLDV: 0.3 %
MONOCYTES # BLD: 0.3 K/UL (ref 0–1.3)
MONOCYTES NFR BLD: 3.4 %
NEUTROPHILS # BLD: 5.3 K/UL (ref 1.7–7.7)
NEUTROPHILS NFR BLD: 65.6 %
O2 CT VFR BLDV CALC: 18 VOL %
O2 THERAPY: ABNORMAL
PCO2 BLDV: 37.3 MMHG (ref 40–50)
PH BLDV: 7.41 [PH] (ref 7.35–7.45)
PLATELET # BLD AUTO: 291 K/UL (ref 135–450)
PMV BLD AUTO: 8.6 FL (ref 5–10.5)
PO2 BLDV: 47.7 MMHG (ref 25–40)
POTASSIUM SERPL-SCNC: 3.9 MMOL/L (ref 3.5–5.1)
PROT SERPL-MCNC: 5.9 G/DL (ref 6.4–8.2)
PROTHROMBIN TIME: 13 SEC (ref 11.9–14.9)
RBC # BLD AUTO: 4.85 M/UL (ref 4–5.2)
SALICYLATES SERPL-MCNC: <0.3 MG/DL (ref 15–30)
SAO2 % BLDV: 84 %
SODIUM SERPL-SCNC: 140 MMOL/L (ref 136–145)
TROPONIN, HIGH SENSITIVITY: <6 NG/L (ref 0–14)
WBC # BLD AUTO: 8 K/UL (ref 4–11)

## 2025-03-16 PROCEDURE — 99285 EMERGENCY DEPT VISIT HI MDM: CPT

## 2025-03-16 PROCEDURE — 93005 ELECTROCARDIOGRAM TRACING: CPT | Performed by: STUDENT IN AN ORGANIZED HEALTH CARE EDUCATION/TRAINING PROGRAM

## 2025-03-16 PROCEDURE — 85610 PROTHROMBIN TIME: CPT

## 2025-03-16 PROCEDURE — 84484 ASSAY OF TROPONIN QUANT: CPT

## 2025-03-16 PROCEDURE — 83605 ASSAY OF LACTIC ACID: CPT

## 2025-03-16 PROCEDURE — 80053 COMPREHEN METABOLIC PANEL: CPT

## 2025-03-16 PROCEDURE — 82803 BLOOD GASES ANY COMBINATION: CPT

## 2025-03-16 PROCEDURE — 96374 THER/PROPH/DIAG INJ IV PUSH: CPT

## 2025-03-16 PROCEDURE — 80179 DRUG ASSAY SALICYLATE: CPT

## 2025-03-16 PROCEDURE — 6360000004 HC RX CONTRAST MEDICATION: Performed by: STUDENT IN AN ORGANIZED HEALTH CARE EDUCATION/TRAINING PROGRAM

## 2025-03-16 PROCEDURE — 85025 COMPLETE CBC W/AUTO DIFF WBC: CPT

## 2025-03-16 PROCEDURE — 71045 X-RAY EXAM CHEST 1 VIEW: CPT

## 2025-03-16 PROCEDURE — 36415 COLL VENOUS BLD VENIPUNCTURE: CPT

## 2025-03-16 PROCEDURE — 80143 DRUG ASSAY ACETAMINOPHEN: CPT

## 2025-03-16 PROCEDURE — 70450 CT HEAD/BRAIN W/O DYE: CPT

## 2025-03-16 PROCEDURE — 6360000002 HC RX W HCPCS: Performed by: STUDENT IN AN ORGANIZED HEALTH CARE EDUCATION/TRAINING PROGRAM

## 2025-03-16 PROCEDURE — 70498 CT ANGIOGRAPHY NECK: CPT

## 2025-03-16 PROCEDURE — 82077 ASSAY SPEC XCP UR&BREATH IA: CPT

## 2025-03-16 RX ORDER — ONDANSETRON 2 MG/ML
INJECTION INTRAMUSCULAR; INTRAVENOUS
Status: DISCONTINUED
Start: 2025-03-16 | End: 2025-03-16 | Stop reason: HOSPADM

## 2025-03-16 RX ORDER — ONDANSETRON 2 MG/ML
4 INJECTION INTRAMUSCULAR; INTRAVENOUS ONCE
Status: COMPLETED | OUTPATIENT
Start: 2025-03-16 | End: 2025-03-16

## 2025-03-16 RX ORDER — IOPAMIDOL 755 MG/ML
75 INJECTION, SOLUTION INTRAVASCULAR
Status: COMPLETED | OUTPATIENT
Start: 2025-03-16 | End: 2025-03-16

## 2025-03-16 RX ADMIN — ONDANSETRON 4 MG: 2 INJECTION INTRAMUSCULAR; INTRAVENOUS at 15:16

## 2025-03-16 RX ADMIN — IOPAMIDOL 75 ML: 755 INJECTION, SOLUTION INTRAVENOUS at 15:25

## 2025-03-16 ASSESSMENT — PAIN SCALES - WONG BAKER: WONGBAKER_NUMERICALRESPONSE: NO HURT

## 2025-03-16 NOTE — FLOWSHEET NOTE
03/16/25 1615   Vital Signs   Temp 98 °F (36.7 °C)   Temp Source Oral   Pulse 54   Respirations 14   BP (!) 143/75   MAP (Calculated) 98   MAP (mmHg) 100   Oxygen Therapy   SpO2 100 %   Pulse via Oximetry 54 beats per minute     Transfer vitals.  Reported to Christel at [facility].      Unable to assess swallow but nothing PO given.  Alina Mccabe RN

## 2025-03-16 NOTE — ED PROVIDER NOTES
of Record.        FINAL IMPRESSION    1. Altered mental status, unspecified altered mental status type           DISPOSITION/PLAN:     Emergently transferred to [facility] for interventional neurology    Disposition Considerations: Transferred to  IR suite    PATIENT REFERRED TO:   No follow-up provider specified.     DISCHARGE MEDICATIONS:   New Prescriptions    No medications on file        DISCONTINUED MEDICATIONS:   Discontinued Medications    No medications on file              (Please note that portions of this note were completed with a voice recognition program.  Efforts were made to edit the dictations but occasionally words are mis-transcribed.)       Holly Lopez MD (electronically signed)             Holly Lopez MD  03/16/25 0540

## 2025-03-16 NOTE — ED NOTES
Spoke with  transfer center to get number for Interventional radiology number, transfer center didn't need any more information from us.  Report to Christel at [facility] on IR team.  All questions and concerns addressed.  Joanna Ness arrived.  Report given to them.   Patient taken lights and sirens down to [facility].  Alina Mccabe RN

## 2025-03-16 NOTE — CONSULTS
malignancy. CARMITA did show a small PFO and cardiology follow up was made outpatient for discussion of closure of PFO. Patient was on clomiphene citrate and smoked 1/2-1 ppd with age >35 therefore may have been hypercoagulable 2/2 to that combination. Hematology was consulted who recommended anticoagulation for 6 months with follow up. Fluoxetine was started for FLAME trial and statin was started.     Since that time, her new baseline is minor-mod word finding difficulty with a contractured RUE, can ambulate independently without cane/walker.  Past Medical History:   Diagnosis Date    Cerebral artery occlusion with cerebral infarction (HCC)     Cerebral infarction (HCC)     Chronic back pain     Depression     Hyperlipidemia     Hypertension         Past Surgical History:   Procedure Laterality Date     SECTION      TONSILLECTOMY         Social History     Socioeconomic History    Marital status: Single     Spouse name: Not on file    Number of children: Not on file    Years of education: Not on file    Highest education level: Not on file   Occupational History    Not on file   Tobacco Use    Smoking status: Every Day     Current packs/day: 1.00     Types: Cigarettes    Smokeless tobacco: Never   Vaping Use    Vaping status: Never Used   Substance and Sexual Activity    Alcohol use: No    Drug use: No    Sexual activity: Yes     Partners: Male   Other Topics Concern    Not on file   Social History Narrative    Not on file     Social Drivers of Health     Financial Resource Strain: Not on file   Food Insecurity: No Transportation Needs (2025)    Received from [facility]    Yearly Questionnaire     Do you need any assistance with obtaining housing, meals, medication, transportation or medical equipment?: No     Assistance needed for:: Not on file   Transportation Needs: No Transportation Needs (2025)    Received from [facility], [facility]    Yearly Questionnaire     Do

## 2025-03-16 NOTE — ED NOTES
1550  Contacted  [facility] for transfer of this patient to [facility] Interventional Radiology- not flying due to weather.    1551  Called [facility]- no unit available until 1900.    1552  Contacted Dario at [facility]-  enroute to [facility].

## 2025-03-16 NOTE — ED NOTES
Patient arrived via squad.  Squad called ahead that patient may be having a stroke.  LKW reported by squad was 3am, which was reported by patients family.  Pt not responsive when she first arrived.  In CT patient woke up a little and vomited.  Zofran given.  Patient able to keep eyes open and look at you but not able to speak at all.  Patient does have right sided weakness from previous stroke.  Right hand has slight contracture.  Alina Mccabe RN

## 2025-03-16 NOTE — ED NOTES
1500  Dr. Lopez called a Code Stroke. [facility] Stroke team contacted. Dr. Deana Sanches will return call.  Patient to CT    1505  [facility] called back, and requested our call back number.    1505  [facility] stroke consult completed by Dr. Deana Sanches.

## 2025-03-17 LAB
EKG ATRIAL RATE: 52 BPM
EKG DIAGNOSIS: NORMAL
EKG P AXIS: 72 DEGREES
EKG P-R INTERVAL: 166 MS
EKG Q-T INTERVAL: 424 MS
EKG QRS DURATION: 84 MS
EKG QTC CALCULATION (BAZETT): 394 MS
EKG R AXIS: 71 DEGREES
EKG T AXIS: 48 DEGREES
EKG VENTRICULAR RATE: 52 BPM
GLUCOSE BLD-MCNC: 102 MG/DL (ref 70–99)
PERFORMED ON: ABNORMAL

## 2025-03-17 PROCEDURE — 93010 ELECTROCARDIOGRAM REPORT: CPT | Performed by: INTERNAL MEDICINE

## 2025-05-08 NOTE — PROGRESS NOTES
Speech/Language treatment with goals per POC.       Treatment time:  2614-9399 (58 minutes); speech tx    Ofelia Jose M.S. [facility]  Speech-language pathologist  [id number]
verbalized understanding. G-Code:  Functional Limitations: Spoken Language Expression  Spoken Language Expression Current Status (): CK- At least 40 percent but less than 60 percent impaired, limited, or restricted  Spoken Language Expression Goal Status (): CI- At least 1 percent but less than 20 percent impaired, limited, or restricted    Plan:  Continued daily Speech/Language treatment with goals per POC.       Sierra Lau M.S. [address] [facility]  Speech-language pathologist  [id number]
Detail Level: Generalized
General Sunscreen Counseling: I recommended a broad spectrum sunscreen with a SPF of 30 or higher.  I explained that SPF 30 sunscreens block approximately 97 percent of the sun's harmful rays.  Sunscreens should be applied at least 15 minutes prior to expected sun exposure and then every 2 hours after that as long as sun exposure continues. If swimming or exercising sunscreen should be reapplied every 45 minutes to an hour after getting wet or sweating.  One ounce, or the equivalent of a shot glass full of sunscreen, is adequate to protect the skin not covered by a bathing suit. I also recommended a lip balm with a sunscreen as well. Sun protective clothing can be used in lieu of sunscreen but must be worn the entire time you are exposed to the sun's rays.
Products Recommended: Zinc sunscreen, Elta MD UV, CUBA De Luna Zinc, Lizzy Bangura Posay, lip balm with SPF, Babo

## 2025-07-16 ENCOUNTER — TRANSCRIBE ORDERS (OUTPATIENT)
Dept: ADMINISTRATIVE | Age: 45
End: 2025-07-16

## 2025-07-16 DIAGNOSIS — Z98.890 S/P CRANIOTOMY: Primary | ICD-10-CM

## 2025-08-10 ENCOUNTER — HOSPITAL ENCOUNTER (OUTPATIENT)
Dept: CT IMAGING | Age: 45
Discharge: HOME | End: 2025-08-10
Payer: COMMERCIAL

## 2025-08-10 DIAGNOSIS — Z98.890 S/P CRANIOTOMY: ICD-10-CM

## 2025-08-10 PROCEDURE — 70450 CT HEAD/BRAIN W/O DYE: CPT

## 2025-08-26 ENCOUNTER — TRANSCRIBE ORDERS (OUTPATIENT)
Dept: ADMINISTRATIVE | Age: 45
End: 2025-08-26

## 2025-08-26 DIAGNOSIS — I63.89 CEREBROVASCULAR ACCIDENT (CVA) DUE TO OTHER MECHANISM (HCC): Primary | ICD-10-CM
